# Patient Record
Sex: FEMALE | Race: WHITE | NOT HISPANIC OR LATINO | Employment: FULL TIME | URBAN - METROPOLITAN AREA
[De-identification: names, ages, dates, MRNs, and addresses within clinical notes are randomized per-mention and may not be internally consistent; named-entity substitution may affect disease eponyms.]

---

## 2017-06-15 ENCOUNTER — LAB REQUISITION (OUTPATIENT)
Dept: LAB | Facility: HOSPITAL | Age: 47
End: 2017-06-15
Payer: COMMERCIAL

## 2017-06-15 ENCOUNTER — ALLSCRIPTS OFFICE VISIT (OUTPATIENT)
Dept: OTHER | Facility: OTHER | Age: 47
End: 2017-06-15

## 2017-06-15 DIAGNOSIS — Z12.31 ENCOUNTER FOR SCREENING MAMMOGRAM FOR MALIGNANT NEOPLASM OF BREAST: ICD-10-CM

## 2017-06-15 DIAGNOSIS — Z01.419 ENCOUNTER FOR GYNECOLOGICAL EXAMINATION WITHOUT ABNORMAL FINDING: ICD-10-CM

## 2017-06-15 PROCEDURE — G0145 SCR C/V CYTO,THINLAYER,RESCR: HCPCS | Performed by: OBSTETRICS & GYNECOLOGY

## 2017-06-16 ENCOUNTER — TRANSCRIBE ORDERS (OUTPATIENT)
Dept: LAB | Facility: HOSPITAL | Age: 47
End: 2017-06-16

## 2017-06-27 LAB
LAB AP GYN PRIMARY INTERPRETATION: NORMAL
LAB AP LMP: NORMAL
Lab: NORMAL
PATH INTERP SPEC-IMP: NORMAL

## 2017-06-29 ENCOUNTER — ALLSCRIPTS OFFICE VISIT (OUTPATIENT)
Dept: OTHER | Facility: OTHER | Age: 47
End: 2017-06-29

## 2017-08-17 ENCOUNTER — ALLSCRIPTS OFFICE VISIT (OUTPATIENT)
Dept: OTHER | Facility: OTHER | Age: 47
End: 2017-08-17

## 2017-08-23 DIAGNOSIS — D39.10 NEOPLASM OF UNCERTAIN BEHAVIOR OF OVARY: ICD-10-CM

## 2017-08-24 ENCOUNTER — TRANSCRIBE ORDERS (OUTPATIENT)
Dept: ADMINISTRATIVE | Age: 47
End: 2017-08-24

## 2017-08-24 ENCOUNTER — APPOINTMENT (OUTPATIENT)
Dept: LAB | Age: 47
End: 2017-08-24
Payer: COMMERCIAL

## 2017-08-24 DIAGNOSIS — D39.10 NEOPLASM OF UNCERTAIN BEHAVIOR OF OVARY, UNSPECIFIED LATERALITY: ICD-10-CM

## 2017-08-24 DIAGNOSIS — D39.10 NEOPLASM OF UNCERTAIN BEHAVIOR OF OVARY, UNSPECIFIED LATERALITY: Primary | ICD-10-CM

## 2017-08-24 LAB — CANCER AG125 SERPL-ACNC: 14.7 U/ML (ref 0–30)

## 2017-08-24 PROCEDURE — 36415 COLL VENOUS BLD VENIPUNCTURE: CPT

## 2017-08-24 PROCEDURE — 86304 IMMUNOASSAY TUMOR CA 125: CPT

## 2017-09-14 ENCOUNTER — ALLSCRIPTS OFFICE VISIT (OUTPATIENT)
Dept: OTHER | Facility: OTHER | Age: 47
End: 2017-09-14

## 2017-09-27 ENCOUNTER — ALLSCRIPTS OFFICE VISIT (OUTPATIENT)
Dept: OTHER | Facility: OTHER | Age: 47
End: 2017-09-27

## 2017-09-29 ENCOUNTER — GENERIC CONVERSION - ENCOUNTER (OUTPATIENT)
Dept: OTHER | Facility: OTHER | Age: 47
End: 2017-09-29

## 2017-09-29 DIAGNOSIS — N83.202 CYST OF LEFT OVARY: ICD-10-CM

## 2017-09-29 DIAGNOSIS — R10.2 PELVIC AND PERINEAL PAIN: ICD-10-CM

## 2017-09-29 DIAGNOSIS — N93.9 ABNORMAL UTERINE AND VAGINAL BLEEDING, UNSPECIFIED: ICD-10-CM

## 2017-10-17 ENCOUNTER — TRANSCRIBE ORDERS (OUTPATIENT)
Dept: ADMINISTRATIVE | Age: 47
End: 2017-10-17

## 2017-10-17 ENCOUNTER — OFFICE VISIT (OUTPATIENT)
Dept: LAB | Age: 47
End: 2017-10-17
Payer: COMMERCIAL

## 2017-10-17 ENCOUNTER — APPOINTMENT (OUTPATIENT)
Dept: LAB | Age: 47
End: 2017-10-17
Payer: COMMERCIAL

## 2017-10-17 ENCOUNTER — APPOINTMENT (OUTPATIENT)
Dept: RADIOLOGY | Age: 47
End: 2017-10-17
Payer: COMMERCIAL

## 2017-10-17 DIAGNOSIS — N83.201 BILATERAL OVARIAN CYSTS: ICD-10-CM

## 2017-10-17 DIAGNOSIS — R10.2 ADNEXAL TENDERNESS, RIGHT: ICD-10-CM

## 2017-10-17 DIAGNOSIS — N93.9 ABNORMAL UTERINE AND VAGINAL BLEEDING, UNSPECIFIED: ICD-10-CM

## 2017-10-17 DIAGNOSIS — N83.202 CYST OF LEFT OVARY: ICD-10-CM

## 2017-10-17 DIAGNOSIS — N93.9 VAGINAL HEMORRHAGE: ICD-10-CM

## 2017-10-17 DIAGNOSIS — N83.202 BILATERAL OVARIAN CYSTS: ICD-10-CM

## 2017-10-17 DIAGNOSIS — R10.2 PELVIC AND PERINEAL PAIN: ICD-10-CM

## 2017-10-17 DIAGNOSIS — N93.9 VAGINAL HEMORRHAGE: Primary | ICD-10-CM

## 2017-10-17 LAB
ALBUMIN SERPL BCP-MCNC: 4.2 G/DL (ref 3.5–5)
ALP SERPL-CCNC: 78 U/L (ref 46–116)
ALT SERPL W P-5'-P-CCNC: 23 U/L (ref 12–78)
ANION GAP SERPL CALCULATED.3IONS-SCNC: 5 MMOL/L (ref 4–13)
APTT PPP: 32 SECONDS (ref 23–35)
AST SERPL W P-5'-P-CCNC: 12 U/L (ref 5–45)
ATRIAL RATE: 70 BPM
BASOPHILS # BLD AUTO: 0.03 THOUSANDS/ΜL (ref 0–0.1)
BASOPHILS NFR BLD AUTO: 0 % (ref 0–1)
BILIRUB SERPL-MCNC: 0.4 MG/DL (ref 0.2–1)
BUN SERPL-MCNC: 6 MG/DL (ref 5–25)
CALCIUM SERPL-MCNC: 9.2 MG/DL (ref 8.3–10.1)
CHLORIDE SERPL-SCNC: 99 MMOL/L (ref 100–108)
CO2 SERPL-SCNC: 31 MMOL/L (ref 21–32)
CREAT SERPL-MCNC: 0.53 MG/DL (ref 0.6–1.3)
EOSINOPHIL # BLD AUTO: 0.1 THOUSAND/ΜL (ref 0–0.61)
EOSINOPHIL NFR BLD AUTO: 1 % (ref 0–6)
ERYTHROCYTE [DISTWIDTH] IN BLOOD BY AUTOMATED COUNT: 12.9 % (ref 11.6–15.1)
EST. AVERAGE GLUCOSE BLD GHB EST-MCNC: 117 MG/DL
FSH SERPL-ACNC: 47.9 MIU/ML
GFR SERPL CREATININE-BSD FRML MDRD: 114 ML/MIN/1.73SQ M
GLUCOSE SERPL-MCNC: 95 MG/DL (ref 65–140)
HBA1C MFR BLD: 5.7 % (ref 4.2–6.3)
HCT VFR BLD AUTO: 41.6 % (ref 34.8–46.1)
HGB BLD-MCNC: 14.2 G/DL (ref 11.5–15.4)
INR PPP: 0.91 (ref 0.86–1.16)
LYMPHOCYTES # BLD AUTO: 3.92 THOUSANDS/ΜL (ref 0.6–4.47)
LYMPHOCYTES NFR BLD AUTO: 45 % (ref 14–44)
MCH RBC QN AUTO: 32 PG (ref 26.8–34.3)
MCHC RBC AUTO-ENTMCNC: 34.1 G/DL (ref 31.4–37.4)
MCV RBC AUTO: 94 FL (ref 82–98)
MONOCYTES # BLD AUTO: 0.63 THOUSAND/ΜL (ref 0.17–1.22)
MONOCYTES NFR BLD AUTO: 7 % (ref 4–12)
NEUTROPHILS # BLD AUTO: 3.99 THOUSANDS/ΜL (ref 1.85–7.62)
NEUTS SEG NFR BLD AUTO: 47 % (ref 43–75)
NRBC BLD AUTO-RTO: 0 /100 WBCS
P AXIS: 38 DEGREES
PLATELET # BLD AUTO: 283 THOUSANDS/UL (ref 149–390)
PMV BLD AUTO: 10.8 FL (ref 8.9–12.7)
POTASSIUM SERPL-SCNC: 4 MMOL/L (ref 3.5–5.3)
PR INTERVAL: 120 MS
PROT SERPL-MCNC: 7.4 G/DL (ref 6.4–8.2)
PROTHROMBIN TIME: 12.3 SECONDS (ref 12.1–14.4)
QRS AXIS: 52 DEGREES
QRSD INTERVAL: 82 MS
QT INTERVAL: 384 MS
QTC INTERVAL: 414 MS
RBC # BLD AUTO: 4.44 MILLION/UL (ref 3.81–5.12)
SODIUM SERPL-SCNC: 135 MMOL/L (ref 136–145)
T WAVE AXIS: 80 DEGREES
VENTRICULAR RATE: 70 BPM
WBC # BLD AUTO: 8.69 THOUSAND/UL (ref 4.31–10.16)

## 2017-10-17 PROCEDURE — 86900 BLOOD TYPING SEROLOGIC ABO: CPT | Performed by: OBSTETRICS & GYNECOLOGY

## 2017-10-17 PROCEDURE — 85730 THROMBOPLASTIN TIME PARTIAL: CPT

## 2017-10-17 PROCEDURE — 86901 BLOOD TYPING SEROLOGIC RH(D): CPT | Performed by: OBSTETRICS & GYNECOLOGY

## 2017-10-17 PROCEDURE — 85025 COMPLETE CBC W/AUTO DIFF WBC: CPT

## 2017-10-17 PROCEDURE — 36415 COLL VENOUS BLD VENIPUNCTURE: CPT

## 2017-10-17 PROCEDURE — 83036 HEMOGLOBIN GLYCOSYLATED A1C: CPT

## 2017-10-17 PROCEDURE — 86850 RBC ANTIBODY SCREEN: CPT | Performed by: OBSTETRICS & GYNECOLOGY

## 2017-10-17 PROCEDURE — 71020 HB CHEST X-RAY 2VW FRONTAL&LATL: CPT

## 2017-10-17 PROCEDURE — 83001 ASSAY OF GONADOTROPIN (FSH): CPT

## 2017-10-17 PROCEDURE — 80053 COMPREHEN METABOLIC PANEL: CPT

## 2017-10-17 PROCEDURE — 85610 PROTHROMBIN TIME: CPT

## 2017-10-17 PROCEDURE — 93005 ELECTROCARDIOGRAM TRACING: CPT

## 2017-10-18 ENCOUNTER — LAB REQUISITION (OUTPATIENT)
Dept: LAB | Facility: HOSPITAL | Age: 47
End: 2017-10-18
Payer: COMMERCIAL

## 2017-10-18 DIAGNOSIS — R10.2 PELVIC AND PERINEAL PAIN: ICD-10-CM

## 2017-10-18 DIAGNOSIS — N83.202 CYST OF LEFT OVARY: ICD-10-CM

## 2017-10-18 DIAGNOSIS — N93.9 ABNORMAL UTERINE AND VAGINAL BLEEDING, UNSPECIFIED (CODE): ICD-10-CM

## 2017-10-18 LAB
ABO GROUP BLD: NORMAL
BLD GP AB SCN SERPL QL: NEGATIVE
RH BLD: POSITIVE
SPECIMEN EXPIRATION DATE: NORMAL

## 2017-10-23 ENCOUNTER — ANESTHESIA EVENT (OUTPATIENT)
Dept: PERIOP | Facility: HOSPITAL | Age: 47
End: 2017-10-23
Payer: COMMERCIAL

## 2017-10-26 RX ORDER — CETIRIZINE HYDROCHLORIDE 10 MG/1
10 TABLET ORAL DAILY
COMMUNITY

## 2017-10-26 RX ORDER — MULTIVITAMIN
1 TABLET ORAL DAILY
COMMUNITY
End: 2018-06-06 | Stop reason: SDUPTHER

## 2017-10-26 NOTE — PRE-PROCEDURE INSTRUCTIONS
Pre-Surgery Instructions:   Medication Instructions    cetirizine (ZyrTEC) 10 mg tablet Instructed patient per Anesthesia Guidelines   Multiple Vitamin (MULTIVITAMIN) tablet Instructed patient per Anesthesia Guidelines  NPO Instructions    Please do not eat or drink anything prior to your surgery as follows: For AM Surgery times = stop at midnight the night before  For PM Surgery times = Midnight to 8AM clear liquids only (Jello, broth, 7up, Sprite, apple juice, black coffee, black tea, Gatorade)  If you are supposed to take any of your medications, do so with a sip of water  Failure to follow these instructions can lead to an increased risk of lung complications and may result in a delay or cancellation of your procedure  If you have any questions, contact your institution for further instructions  Medical Procedure Risk          Smoking Cessation    Smoking before and after surgery can lead to complications  Patients who quit smoking at least eight weeks before surgery have complication rates almost as low as non-smokers  Smokers who can stop smoking 24 or 48 hours before surgery may also benefit from decreased amounts of nicotine and carbon monoxide in the body  For help quitting smoking, speak with your physician or contact the Divina Mcduffie or [employer]  Please visit the following web address for assistance with quitting  [facility]  [facility]/Anesthesia-[email or web address]  aspx          Current every day smoker      Accept All Complete All

## 2017-10-31 ENCOUNTER — ANESTHESIA (OUTPATIENT)
Dept: PERIOP | Facility: HOSPITAL | Age: 47
End: 2017-10-31
Payer: COMMERCIAL

## 2017-10-31 ENCOUNTER — HOSPITAL ENCOUNTER (OUTPATIENT)
Facility: HOSPITAL | Age: 47
Setting detail: OUTPATIENT SURGERY
Discharge: HOME | End: 2017-11-01
Attending: OBSTETRICS & GYNECOLOGY | Admitting: OBSTETRICS & GYNECOLOGY
Payer: COMMERCIAL

## 2017-10-31 DIAGNOSIS — N93.9 ABNORMAL UTERINE BLEEDING: ICD-10-CM

## 2017-10-31 DIAGNOSIS — N83.202 CYST OF LEFT OVARY: ICD-10-CM

## 2017-10-31 DIAGNOSIS — R10.2 PELVIC AND PERINEAL PAIN: ICD-10-CM

## 2017-10-31 PROBLEM — N83.8 OVARIAN MASS, LEFT: Status: ACTIVE | Noted: 2017-10-31

## 2017-10-31 LAB
EXT PREGNANCY TEST URINE: NEGATIVE
GLUCOSE SERPL-MCNC: 95 MG/DL (ref 65–140)

## 2017-10-31 PROCEDURE — 86923 COMPATIBILITY TEST ELECTRIC: CPT

## 2017-10-31 PROCEDURE — 88307 TISSUE EXAM BY PATHOLOGIST: CPT | Performed by: OBSTETRICS & GYNECOLOGY

## 2017-10-31 PROCEDURE — 81025 URINE PREGNANCY TEST: CPT | Performed by: OBSTETRICS & GYNECOLOGY

## 2017-10-31 PROCEDURE — 82948 REAGENT STRIP/BLOOD GLUCOSE: CPT

## 2017-10-31 RX ORDER — LIDOCAINE HYDROCHLORIDE 10 MG/ML
INJECTION, SOLUTION INFILTRATION; PERINEURAL AS NEEDED
Status: DISCONTINUED | OUTPATIENT
Start: 2017-10-31 | End: 2017-10-31 | Stop reason: SURG

## 2017-10-31 RX ORDER — BUPIVACAINE HYDROCHLORIDE 2.5 MG/ML
INJECTION, SOLUTION INFILTRATION; PERINEURAL AS NEEDED
Status: DISCONTINUED | OUTPATIENT
Start: 2017-10-31 | End: 2017-10-31 | Stop reason: HOSPADM

## 2017-10-31 RX ORDER — HYDROMORPHONE HYDROCHLORIDE 2 MG/ML
INJECTION, SOLUTION INTRAMUSCULAR; INTRAVENOUS; SUBCUTANEOUS AS NEEDED
Status: DISCONTINUED | OUTPATIENT
Start: 2017-10-31 | End: 2017-10-31 | Stop reason: SURG

## 2017-10-31 RX ORDER — SODIUM CHLORIDE 9 MG/ML
INJECTION, SOLUTION INTRAVENOUS CONTINUOUS PRN
Status: DISCONTINUED | OUTPATIENT
Start: 2017-10-31 | End: 2017-10-31 | Stop reason: SURG

## 2017-10-31 RX ORDER — LORATADINE 10 MG/1
10 TABLET ORAL DAILY
Status: DISCONTINUED | OUTPATIENT
Start: 2017-11-01 | End: 2017-11-01 | Stop reason: HOSPADM

## 2017-10-31 RX ORDER — ONDANSETRON 2 MG/ML
INJECTION INTRAMUSCULAR; INTRAVENOUS AS NEEDED
Status: DISCONTINUED | OUTPATIENT
Start: 2017-10-31 | End: 2017-10-31 | Stop reason: SURG

## 2017-10-31 RX ORDER — ROCURONIUM BROMIDE 10 MG/ML
INJECTION, SOLUTION INTRAVENOUS AS NEEDED
Status: DISCONTINUED | OUTPATIENT
Start: 2017-10-31 | End: 2017-10-31 | Stop reason: SURG

## 2017-10-31 RX ORDER — FENTANYL CITRATE/PF 50 MCG/ML
50 SYRINGE (ML) INJECTION
Status: COMPLETED | OUTPATIENT
Start: 2017-10-31 | End: 2017-10-31

## 2017-10-31 RX ORDER — ONDANSETRON 2 MG/ML
4 INJECTION INTRAMUSCULAR; INTRAVENOUS ONCE AS NEEDED
Status: DISCONTINUED | OUTPATIENT
Start: 2017-10-31 | End: 2017-10-31 | Stop reason: HOSPADM

## 2017-10-31 RX ORDER — KETOROLAC TROMETHAMINE 30 MG/ML
INJECTION, SOLUTION INTRAMUSCULAR; INTRAVENOUS AS NEEDED
Status: DISCONTINUED | OUTPATIENT
Start: 2017-10-31 | End: 2017-10-31 | Stop reason: SURG

## 2017-10-31 RX ORDER — SODIUM CHLORIDE, SODIUM LACTATE, POTASSIUM CHLORIDE, CALCIUM CHLORIDE 600; 310; 30; 20 MG/100ML; MG/100ML; MG/100ML; MG/100ML
125 INJECTION, SOLUTION INTRAVENOUS CONTINUOUS
Status: DISCONTINUED | OUTPATIENT
Start: 2017-10-31 | End: 2017-11-01 | Stop reason: HOSPADM

## 2017-10-31 RX ORDER — PROPOFOL 10 MG/ML
INJECTION, EMULSION INTRAVENOUS AS NEEDED
Status: DISCONTINUED | OUTPATIENT
Start: 2017-10-31 | End: 2017-10-31 | Stop reason: SURG

## 2017-10-31 RX ORDER — MIDAZOLAM HYDROCHLORIDE 1 MG/ML
INJECTION INTRAMUSCULAR; INTRAVENOUS AS NEEDED
Status: DISCONTINUED | OUTPATIENT
Start: 2017-10-31 | End: 2017-10-31 | Stop reason: SURG

## 2017-10-31 RX ORDER — ALBUTEROL SULFATE 2.5 MG/3ML
2.5 SOLUTION RESPIRATORY (INHALATION) AS NEEDED
Status: DISCONTINUED | OUTPATIENT
Start: 2017-10-31 | End: 2017-10-31 | Stop reason: HOSPADM

## 2017-10-31 RX ORDER — NICOTINE 21 MG/24HR
1 PATCH, TRANSDERMAL 24 HOURS TRANSDERMAL DAILY
Status: DISCONTINUED | OUTPATIENT
Start: 2017-10-31 | End: 2017-11-01 | Stop reason: HOSPADM

## 2017-10-31 RX ORDER — ONDANSETRON 2 MG/ML
4 INJECTION INTRAMUSCULAR; INTRAVENOUS EVERY 6 HOURS PRN
Status: DISCONTINUED | OUTPATIENT
Start: 2017-10-31 | End: 2017-11-01 | Stop reason: HOSPADM

## 2017-10-31 RX ORDER — MEPERIDINE HYDROCHLORIDE 25 MG/ML
12.5 INJECTION INTRAMUSCULAR; INTRAVENOUS; SUBCUTANEOUS ONCE
Status: COMPLETED | OUTPATIENT
Start: 2017-10-31 | End: 2017-10-31

## 2017-10-31 RX ORDER — FENTANYL CITRATE 50 UG/ML
INJECTION, SOLUTION INTRAMUSCULAR; INTRAVENOUS AS NEEDED
Status: DISCONTINUED | OUTPATIENT
Start: 2017-10-31 | End: 2017-10-31 | Stop reason: SURG

## 2017-10-31 RX ORDER — OXYCODONE HYDROCHLORIDE AND ACETAMINOPHEN 5; 325 MG/1; MG/1
1 TABLET ORAL EVERY 4 HOURS PRN
Status: DISCONTINUED | OUTPATIENT
Start: 2017-10-31 | End: 2017-11-01 | Stop reason: HOSPADM

## 2017-10-31 RX ORDER — IBUPROFEN 400 MG/1
600 TABLET ORAL EVERY 6 HOURS PRN
Status: DISCONTINUED | OUTPATIENT
Start: 2017-10-31 | End: 2017-11-01 | Stop reason: HOSPADM

## 2017-10-31 RX ORDER — ACETAMINOPHEN 325 MG/1
650 TABLET ORAL EVERY 4 HOURS PRN
Status: DISCONTINUED | OUTPATIENT
Start: 2017-10-31 | End: 2017-11-01 | Stop reason: HOSPADM

## 2017-10-31 RX ORDER — DIPHENHYDRAMINE HYDROCHLORIDE 50 MG/ML
12.5 INJECTION INTRAMUSCULAR; INTRAVENOUS ONCE AS NEEDED
Status: DISCONTINUED | OUTPATIENT
Start: 2017-10-31 | End: 2017-10-31 | Stop reason: HOSPADM

## 2017-10-31 RX ORDER — OXYCODONE HYDROCHLORIDE 10 MG/1
10 TABLET ORAL EVERY 4 HOURS PRN
Status: DISCONTINUED | OUTPATIENT
Start: 2017-10-31 | End: 2017-11-01 | Stop reason: HOSPADM

## 2017-10-31 RX ORDER — GLYCOPYRROLATE 0.2 MG/ML
INJECTION INTRAMUSCULAR; INTRAVENOUS AS NEEDED
Status: DISCONTINUED | OUTPATIENT
Start: 2017-10-31 | End: 2017-10-31 | Stop reason: SURG

## 2017-10-31 RX ORDER — METOCLOPRAMIDE HYDROCHLORIDE 5 MG/ML
10 INJECTION INTRAMUSCULAR; INTRAVENOUS ONCE AS NEEDED
Status: DISCONTINUED | OUTPATIENT
Start: 2017-10-31 | End: 2017-10-31 | Stop reason: HOSPADM

## 2017-10-31 RX ORDER — CALCIUM CARBONATE 500(1250)
1 TABLET ORAL
Status: DISCONTINUED | OUTPATIENT
Start: 2017-11-01 | End: 2017-11-01 | Stop reason: HOSPADM

## 2017-10-31 RX ADMIN — CEFAZOLIN SODIUM 1000 MG: 1 SOLUTION INTRAVENOUS at 16:20

## 2017-10-31 RX ADMIN — OXYCODONE HYDROCHLORIDE 10 MG: 10 TABLET ORAL at 22:06

## 2017-10-31 RX ADMIN — FENTANYL CITRATE 50 MCG: 50 INJECTION, SOLUTION INTRAMUSCULAR; INTRAVENOUS at 16:18

## 2017-10-31 RX ADMIN — HYDROMORPHONE HYDROCHLORIDE 0.4 MG: 1 INJECTION, SOLUTION INTRAMUSCULAR; INTRAVENOUS; SUBCUTANEOUS at 18:38

## 2017-10-31 RX ADMIN — NICOTINE 1 PATCH: 21 PATCH, EXTENDED RELEASE TRANSDERMAL at 19:35

## 2017-10-31 RX ADMIN — HYDROMORPHONE HYDROCHLORIDE 0.4 MG: 1 INJECTION, SOLUTION INTRAMUSCULAR; INTRAVENOUS; SUBCUTANEOUS at 19:23

## 2017-10-31 RX ADMIN — MIDAZOLAM HYDROCHLORIDE 2 MG: 1 INJECTION, SOLUTION INTRAMUSCULAR; INTRAVENOUS at 16:04

## 2017-10-31 RX ADMIN — NEOSTIGMINE METHYLSULFATE 4 MG: 1 INJECTION, SOLUTION INTRAMUSCULAR; INTRAVENOUS; SUBCUTANEOUS at 17:57

## 2017-10-31 RX ADMIN — FENTANYL CITRATE 50 MCG: 50 INJECTION INTRAMUSCULAR; INTRAVENOUS at 18:30

## 2017-10-31 RX ADMIN — GLYCOPYRROLATE 0.8 MG: 0.2 INJECTION, SOLUTION INTRAMUSCULAR; INTRAVENOUS at 17:57

## 2017-10-31 RX ADMIN — MEPERIDINE HYDROCHLORIDE 12.5 MG: 25 INJECTION, SOLUTION INTRAMUSCULAR; INTRAVENOUS; SUBCUTANEOUS at 18:26

## 2017-10-31 RX ADMIN — ENOXAPARIN SODIUM 40 MG: 40 INJECTION SUBCUTANEOUS at 16:27

## 2017-10-31 RX ADMIN — DEXAMETHASONE SODIUM PHOSPHATE 10 MG: 10 INJECTION INTRAMUSCULAR; INTRAVENOUS at 16:28

## 2017-10-31 RX ADMIN — SODIUM CHLORIDE: 0.9 INJECTION, SOLUTION INTRAVENOUS at 16:15

## 2017-10-31 RX ADMIN — HYDROMORPHONE HYDROCHLORIDE 0.3 MG: 2 INJECTION, SOLUTION INTRAMUSCULAR; INTRAVENOUS; SUBCUTANEOUS at 17:13

## 2017-10-31 RX ADMIN — FENTANYL CITRATE 50 MCG: 50 INJECTION, SOLUTION INTRAMUSCULAR; INTRAVENOUS at 16:35

## 2017-10-31 RX ADMIN — PROPOFOL 200 MG: 10 INJECTION, EMULSION INTRAVENOUS at 16:13

## 2017-10-31 RX ADMIN — ROCURONIUM BROMIDE 10 MG: 10 INJECTION, SOLUTION INTRAVENOUS at 16:53

## 2017-10-31 RX ADMIN — HYDROMORPHONE HYDROCHLORIDE 0.4 MG: 1 INJECTION, SOLUTION INTRAMUSCULAR; INTRAVENOUS; SUBCUTANEOUS at 19:10

## 2017-10-31 RX ADMIN — SODIUM CHLORIDE, SODIUM LACTATE, POTASSIUM CHLORIDE, AND CALCIUM CHLORIDE 125 ML/HR: .6; .31; .03; .02 INJECTION, SOLUTION INTRAVENOUS at 18:50

## 2017-10-31 RX ADMIN — KETOROLAC TROMETHAMINE 15 MG: 30 INJECTION, SOLUTION INTRAMUSCULAR at 17:49

## 2017-10-31 RX ADMIN — HYDROMORPHONE HYDROCHLORIDE 0.7 MG: 2 INJECTION, SOLUTION INTRAMUSCULAR; INTRAVENOUS; SUBCUTANEOUS at 18:01

## 2017-10-31 RX ADMIN — ONDANSETRON 4 MG: 2 INJECTION INTRAMUSCULAR; INTRAVENOUS at 17:49

## 2017-10-31 RX ADMIN — FENTANYL CITRATE 50 MCG: 50 INJECTION INTRAMUSCULAR; INTRAVENOUS at 18:25

## 2017-10-31 RX ADMIN — SODIUM CHLORIDE, SODIUM LACTATE, POTASSIUM CHLORIDE, AND CALCIUM CHLORIDE: .6; .31; .03; .02 INJECTION, SOLUTION INTRAVENOUS at 16:04

## 2017-10-31 RX ADMIN — HYDROMORPHONE HYDROCHLORIDE 0.4 MG: 1 INJECTION, SOLUTION INTRAMUSCULAR; INTRAVENOUS; SUBCUTANEOUS at 18:57

## 2017-10-31 RX ADMIN — SODIUM CHLORIDE, SODIUM LACTATE, POTASSIUM CHLORIDE, AND CALCIUM CHLORIDE 125 ML/HR: .6; .31; .03; .02 INJECTION, SOLUTION INTRAVENOUS at 14:17

## 2017-10-31 RX ADMIN — ROCURONIUM BROMIDE 10 MG: 10 INJECTION, SOLUTION INTRAVENOUS at 17:13

## 2017-10-31 RX ADMIN — LIDOCAINE HYDROCHLORIDE 50 MG: 10 INJECTION, SOLUTION INFILTRATION; PERINEURAL at 16:13

## 2017-10-31 RX ADMIN — ROCURONIUM BROMIDE 40 MG: 10 INJECTION, SOLUTION INTRAVENOUS at 16:14

## 2017-10-31 NOTE — ANESTHESIA POSTPROCEDURE EVALUATION
Post-Op Assessment Note      CV Status:  Stable    Mental Status:  Alert and awake    Hydration Status:  Euvolemic    PONV Controlled:  Controlled    Airway Patency:  Patent    Post Op Vitals Reviewed: Yes          Staff: CRNA           BP   136/98     Temp (!) 97 3 °F (36 3 °C) (10/31/17 1808)    Pulse 91 (10/31/17 1808)   Resp 12 (10/31/17 1808)    SpO2 100 % (10/31/17 1808)

## 2017-10-31 NOTE — H&P
H&P Exam - Gynecology   [facility] 56 y o  female MRN: [id number]  Unit/Bed#: OR POOL Encounter: [id number]    Assessment/Plan     Assessment:  61-year-old para 2 who does not desire future fertility with an enlarging 4 7 x 4 8 cm left adnexal mass, left-sided pelvic pain, abnormal uterine bleeding    Plan:  Plan for Laparoscopic TLH and BS and LO, possible bilateral Oophorectomy  and all indicated procedures for the above mentioned      History of Present Illness     HPI: 61-year-old para 2 with pelvic pain and underwent an ultrasound in June 2017  This revealed the left ovary to measure 4 2 x 3 5 cm  She then had a follow-up ultrasound on August 17, 2017 that revealed the left ovary to measure 4 7 x 4 8 cm  The uterus was 8 7 x 5 6 cm  Endometrial thickness was 4 mm  She has persistent left sided pelvic pain  She does not require any pain medicine  She is able to perform her activities of daily living but the pain is annoying  She has abnormal uterine bleeding and that her menses have become infrequent and irregular with heavy bleeding  Review of Systems   Constitutional: Negative  Genitourinary:        Left sided chronic pelvic pain       Historical Information   History reviewed  No pertinent past medical history  Past Surgical History:   Procedure Laterality Date    MOUTH SURGERY      VAGINAL MASS EXCISION       OB/GYN History:   History reviewed  No pertinent family history  Social History   History   Alcohol Use    8 4 oz/week    14 Shots of liquor per week     History   Drug Use No     History   Smoking Status    Current Every Day Smoker    Packs/day: 0 25   Smokeless Tobacco    Never Used       Meds/Allergies   all current active meds have been reviewed  Allergies   Allergen Reactions    Sulfa Antibiotics GI Intolerance     Vomites alot       Objective   Vitals: Blood pressure 124/86, pulse 71, temperature 98 5 °F (36 9 °C), temperature source Tympanic Core, resp   rate 16, height 5' 2" (1 575 m), weight 53 1 kg (117 lb), SpO2 97 %  No intake or output data in the 24 hours ending 10/31/17 1517    Invasive Devices: Invasive Devices     Peripheral Intravenous Line            Peripheral IV 10/31/17 Left Hand less than 1 day                Physical Exam   Constitutional: She is oriented to person, place, and time  She appears well-developed and well-nourished  HENT:   Head: Normocephalic and atraumatic  Cardiovascular: Normal rate, regular rhythm and normal heart sounds  Pulmonary/Chest: Effort normal and breath sounds normal  No respiratory distress  She has no wheezes  She has no rales  Abdominal: Soft  Bowel sounds are normal    Genitourinary:   Genitourinary Comments: deferred   Musculoskeletal: Normal range of motion  Neurological: She is alert and oriented to person, place, and time  She has normal reflexes  Lab Results: I have personally reviewed pertinent reports  Imaging: I have personally reviewed pertinent reports  EKG, Pathology, and Other Studies: I have personally reviewed pertinent reports  Code Status: No Order  Advance Directive and Living Will:      Power of :    POLST:      Counseling / Coordination of Care  Total floor / unit time spent today 5 minutes  Greater than 50% of total time was spent with the patient and / or family counseling and / or coordination of care  A description of the counseling / coordination of care: Georgette Ormond

## 2017-10-31 NOTE — ANESTHESIA PREPROCEDURE EVALUATION
Review of Systems/Medical History  Patient summary reviewed  Chart reviewed  History of anesthetic complications     Cardiovascular  Negative cardio ROS Exercise tolerance: good,     Pulmonary  Smoker , ,        GI/Hepatic  Negative GI/hepatic ROS          Negative  ROS        Endo/Other  Negative endo/other ROS      GYN  Not currently pregnant ,     Comment: Ovarian cyst     Hematology  Negative hematology ROS      Musculoskeletal  Negative musculoskeletal ROS        Neurology  Negative neurology ROS      Psychology   Negative psychology ROS          Physical Exam    Airway    Mallampati score: II  TM Distance: >3 FB       Dental   No notable dental hx     Cardiovascular  Comment: Negative ROS,     Pulmonary      Other Findings      Lab Results   Component Value Date    WBC 8 69 10/17/2017    HGB 14 2 10/17/2017     10/17/2017     Lab Results   Component Value Date     (L) 10/17/2017    K 4 0 10/17/2017    BUN 6 10/17/2017    CREATININE 0 53 (L) 10/17/2017    GLUCOSE 95 10/17/2017     Lab Results   Component Value Date    PTT 32 10/17/2017      Lab Results   Component Value Date    INR 0 91 10/17/2017     Blood type O+/antibody neg  Lab Results   Component Value Date    HGBA1C 5 7 10/17/2017     Anesthesia Plan  ASA Score- 2       Anesthesia Type- general with ASA Monitors  Additional Monitors:   Airway Plan: ETT  Comment: Additional PIV  Induction- intravenous  Informed Consent- Anesthetic plan and risks discussed with patient, spouse, mother and sibling  I personally reviewed this patient with the CRNA  Discussed and agreed on the Anesthesia Plan with the CRNA  Yumi Olivo

## 2017-10-31 NOTE — OP NOTE
OPERATIVE REPORT  PATIENT NAME: Haresh Del Angel    :  1970  MRN: [id number]  Pt Location: [facility] OR ROOM 10    SURGERY DATE: 10/31/2017    Surgeon(s) and Role:     * Nakia Ritchie MD - Primary     * Leticia Mcgowan - Assisting     * Adalid Art MD - Assisting     * Lino Ashraf MD - Assisting    Preop Diagnosis:  Pelvic and perineal pain [R10 2]  Abnormal uterine bleeding [N93 9]  Cyst of left ovary [N83 202]    Post-Op Diagnosis Codes:     * Pelvic and perineal pain [R10 2]     * Abnormal uterine bleeding [N93 9]     * Cyst of left ovary [N83 202]    Procedure(s) (LRB):  TOTAL LAPAROSCOPIC HYSTERECTOMY; BILATERAL SALPINECTOMY; LEFT OOPHERECTOMY; POSSIBLE BILATERAL OOPHERECOTMY (N/A)  LAPAROTOMY EXPLORATORY (N/A)    Specimen(s):  ID Type Source Tests Collected by Time Destination   1 : UTERUS, CERVIX, B/L FALLOPIAN TUBES, LEFT OVARY Tissue Uterus TISSUE EXAM Nakia Ritchie MD 10/31/2017 1723        Estimated Blood Loss:   Minimal    Drains:       Anesthesia Type:   General    Operative Indications:  Pelvic and perineal pain [R10 2]  Abnormal uterine bleeding [N93 9]  Cyst of left ovary [V74156]  56year-old with a 4 7 x 4 8 cm left ovarian mass  She also has abnormal uterine bleeding  She presented for definitive operative management and potential surgical staging  Operative Findings:  1  Mobile uterus  Grossly normal cervix  2   On laparoscopy, there was no evidence of peritoneal or upper abdominal disease  The left ovary had a 5 cm simple appearing cyst   There were no surface excrescences  The right ovary was grossly normal  The cyst was opened after was removed and the lining was simple  3   Cystoscopy demonstrated bilateral jets and no evidence of bladder injury  Complications:   None    Procedure and Technique:  After informed consent was obtained, the patient was taken to the operating room where general endotracheal anesthesia was administered without incident    She was then prepped and draped in the normal sterile fashion in the low dorsal lithotomy position  Examination under anesthesia revealed the above-mentioned findings  A speculum was placed in the patient's vagina  The anterior lip of the cervix was grasped with a single-tooth tenaculum  A 0 Vicryl stay suture was placed at 3 o'clock on the cervix  The uterus sounded to 7 cm  The cervix was dilated  A uterine manipulator and Koh cup were placed  Darden catheter was inserted  Attention was then turned to the abdomen  0 25% Marcaine was used to infiltrate the skin prior to placement of any trocar  The 1st insertion site was at the supraumbilical fold  A 5 mm skin incision was made using an 11 blade scalpel  Through this was passed a 5 mm trocar under direct visualization into the abdominal cavity  The abdomen is then insufflated to 15 mm of mercury using CO2 gas  Findings are noted as above  Additional ports were placed under direct visualization  A 5 mm port was placed in left lower quadrant and a 5 mm port was placed in the right lower quadrant  The retroperitoneal spaces were opened by transecting the round ligaments bilaterally  The ureters were identified coursing normally within the medial leaf of the broad ligaments bilaterally  On the patient's right side, the fallopian tube was grasped and elevated  The mesial salpinx was cauterized and transected  The vesicovaginal space was then developed  The bladder was taken down below the level of the external os of the cervix  The utero-ovarian ligament on the right side was cauterized and transected  The left infundibulopelvic ligament was cauterized and transected  The uterine vessels were skeletonized bilaterally  They were cauterized and transected  The cardinal ligaments were cauterized and transected  A circumferential colpotomy was then performed using monopolar cautery    The uterus, cervix, bilateral fallopian tubes and left ovary were then removed transvaginally  The vagina was then closed using a running 2-0 stratafix suture with excellent hemostasis noted  The pelvis was irrigated  The pressure in the pelvis was brought down to 5 mm of mercury  There is no evidence of bleeding noted  The gas was then removed from the abdominal cavity  The ports were then removed  The skin was closed at all sites using 4-0 Monocryl followed by histoacryl  The Darden catheter was removed  The bladder was insufflated with 200 cc of normal saline  The 5 mm laparoscoped was used as a cystoscope  Findings are noted as above  The Darden catheter was then replaced  The vagina was inspected  There is no evidence of bleeding noted  The patient was then awakened and transferred to recovery room stable condition  All instrument counts correct x2 for the procedure  No complications  Estimated blood loss 50 mL     I was present for the entire procedure    Patient Disposition:  PACU     SIGNATURE: Jaci Carrillo MD  DATE: October 31, 2017  TIME: 6:09 PM

## 2017-11-01 VITALS
RESPIRATION RATE: 18 BRPM | SYSTOLIC BLOOD PRESSURE: 135 MMHG | TEMPERATURE: 98.5 F | HEART RATE: 88 BPM | BODY MASS INDEX: 21.53 KG/M2 | WEIGHT: 117 LBS | HEIGHT: 62 IN | OXYGEN SATURATION: 99 % | DIASTOLIC BLOOD PRESSURE: 86 MMHG

## 2017-11-01 LAB
ABO GROUP BLD BPU: NORMAL
ABO GROUP BLD BPU: NORMAL
ANION GAP SERPL CALCULATED.3IONS-SCNC: 6 MMOL/L (ref 4–13)
BASOPHILS # BLD AUTO: 0 THOUSANDS/ΜL (ref 0–0.1)
BASOPHILS NFR BLD AUTO: 0 % (ref 0–1)
BPU ID: NORMAL
BPU ID: NORMAL
BUN SERPL-MCNC: 5 MG/DL (ref 5–25)
CALCIUM SERPL-MCNC: 8.2 MG/DL (ref 8.3–10.1)
CHLORIDE SERPL-SCNC: 104 MMOL/L (ref 100–108)
CO2 SERPL-SCNC: 28 MMOL/L (ref 21–32)
CREAT SERPL-MCNC: 0.51 MG/DL (ref 0.6–1.3)
EOSINOPHIL # BLD AUTO: 0 THOUSAND/ΜL (ref 0–0.61)
EOSINOPHIL NFR BLD AUTO: 0 % (ref 0–6)
ERYTHROCYTE [DISTWIDTH] IN BLOOD BY AUTOMATED COUNT: 13 % (ref 11.6–15.1)
GFR SERPL CREATININE-BSD FRML MDRD: 116 ML/MIN/1.73SQ M
GLUCOSE P FAST SERPL-MCNC: 138 MG/DL (ref 65–99)
GLUCOSE SERPL-MCNC: 138 MG/DL (ref 65–140)
HCT VFR BLD AUTO: 34.2 % (ref 34.8–46.1)
HGB BLD-MCNC: 11.8 G/DL (ref 11.5–15.4)
LYMPHOCYTES # BLD AUTO: 1.15 THOUSANDS/ΜL (ref 0.6–4.47)
LYMPHOCYTES NFR BLD AUTO: 10 % (ref 14–44)
MCH RBC QN AUTO: 32.2 PG (ref 26.8–34.3)
MCHC RBC AUTO-ENTMCNC: 34.5 G/DL (ref 31.4–37.4)
MCV RBC AUTO: 93 FL (ref 82–98)
MONOCYTES # BLD AUTO: 0.49 THOUSAND/ΜL (ref 0.17–1.22)
MONOCYTES NFR BLD AUTO: 4 % (ref 4–12)
NEUTROPHILS # BLD AUTO: 9.49 THOUSANDS/ΜL (ref 1.85–7.62)
NEUTS SEG NFR BLD AUTO: 86 % (ref 43–75)
NRBC BLD AUTO-RTO: 0 /100 WBCS
PLATELET # BLD AUTO: 226 THOUSANDS/UL (ref 149–390)
PMV BLD AUTO: 10.1 FL (ref 8.9–12.7)
POTASSIUM SERPL-SCNC: 4 MMOL/L (ref 3.5–5.3)
RBC # BLD AUTO: 3.67 MILLION/UL (ref 3.81–5.12)
SODIUM SERPL-SCNC: 138 MMOL/L (ref 136–145)
UNIT DISPENSE STATUS: NORMAL
UNIT DISPENSE STATUS: NORMAL
UNIT PRODUCT CODE: NORMAL
UNIT PRODUCT CODE: NORMAL
UNIT RH: NORMAL
UNIT RH: NORMAL
WBC # BLD AUTO: 11.14 THOUSAND/UL (ref 4.31–10.16)

## 2017-11-01 PROCEDURE — 85025 COMPLETE CBC W/AUTO DIFF WBC: CPT | Performed by: OBSTETRICS & GYNECOLOGY

## 2017-11-01 PROCEDURE — 80048 BASIC METABOLIC PNL TOTAL CA: CPT | Performed by: OBSTETRICS & GYNECOLOGY

## 2017-11-01 RX ORDER — OXYCODONE HYDROCHLORIDE AND ACETAMINOPHEN 5; 325 MG/1; MG/1
1 TABLET ORAL EVERY 4 HOURS PRN
Refills: 0
Start: 2017-11-01 | End: 2017-11-11

## 2017-11-01 RX ADMIN — Medication 1 TABLET: at 08:12

## 2017-11-01 RX ADMIN — IBUPROFEN 600 MG: 400 TABLET, FILM COATED ORAL at 08:12

## 2017-11-01 RX ADMIN — LORATADINE 10 MG: 10 TABLET ORAL at 08:12

## 2017-11-01 RX ADMIN — SODIUM CHLORIDE, SODIUM LACTATE, POTASSIUM CHLORIDE, AND CALCIUM CHLORIDE 125 ML/HR: .6; .31; .03; .02 INJECTION, SOLUTION INTRAVENOUS at 00:30

## 2017-11-01 RX ADMIN — NICOTINE 1 PATCH: 21 PATCH, EXTENDED RELEASE TRANSDERMAL at 08:13

## 2017-11-01 RX ADMIN — SODIUM CHLORIDE 1000 ML: 0.9 INJECTION, SOLUTION INTRAVENOUS at 00:29

## 2017-11-01 NOTE — PROGRESS NOTES
Gyn Oncology Post op note  Noe Cantor 56 y o  female MRN: [id number]  Unit/Bed#: CW2 216-02 Encounter: [id number]    Noe Cantor has no current complaints  Pain is none  Patient is currently voiding urine in vargas catheter  She is not ambulating  Patient is not currently passing flatus and has had no bowel movement  She is tolerating clear liquid PO, and denies nausea or vomitting  Patient denies fever, chills, chest pain, shortness of breath, or calf tenderness  /76   Pulse 103   Temp 98 4 °F (36 9 °C) (Oral)   Resp 16   Ht 5' 2" (1 575 m)   Wt 53 1 kg (117 lb)   SpO2 93%   BMI 21 40 kg/m²     I/O last 3 completed shifts: In: 2100 [I V :2100]  Out: -   I/O this shift:  In: 125 [I V :125]  Out: 375 [Urine:375]   ~75mL of clear yellow urine in vargas now    Lab Results   Component Value Date    WBC 8 69 10/17/2017    HGB 14 2 10/17/2017    HCT 41 6 10/17/2017    MCV 94 10/17/2017     10/17/2017       Lab Results   Component Value Date    GLUCOSE 95 10/17/2017    CALCIUM 9 2 10/17/2017     (L) 10/17/2017    K 4 0 10/17/2017    CO2 31 10/17/2017    CL 99 (L) 10/17/2017    BUN 6 10/17/2017    CREATININE 0 53 (L) 10/17/2017       Lab Results   Component Value Date/Time    POCGLU 95 10/31/2017 06:12 PM       Physical Exam  Gen: AAOx3, NAD, comfortable in bed  Abdomen: soft, non tender, incisions C/D/I  Extremities: SCDs on and on, non tender    A/P: 56 y o  s/p TLH, bilateral salpingectomy, cysto for pelvic pain with left ovarian mass & AUB  1) pelvic pain, L ovarian mass, AUB- s/p resection, f/u final path as out-pt  2) Post-op care:  *Pain: tylenol, motrin, roxicodone PRN  *FEN: regular diet, IV [employer]  *DVT PPx: SCDs  *Encouraged incentive spirometry to reduce atelectasis and pneumonia risk  *Encouraged ambulation as tolerated  *maintain vargas catheter overnight for I/O  *low urine output ~18mL/hr  Pt states she feels dehydrated  Will give 1L NS bolus   VSS and no concern for active bleeding    3) Dispo: anticipate d/c home tomorrow if spontaneously voiding, pain controlled, ambulating without assistance     Kym Daley  10/31/2017  11:25 PM

## 2017-11-01 NOTE — DISCHARGE INSTRUCTIONS
Laparoscopic Hysterectomy   WHAT YOU SHOULD KNOW:   Laparoscopic hysterectomy [facility]) is surgery to remove your uterus only (partial hysterectomy), or your uterus and cervix (total hysterectomy)  Other organs, such as your ovaries and fallopian tubes, may also be removed  AFTER YOU LEAVE:   Medicines:   · Medicines  may be given to decrease pain or to treat or prevent a bacterial infection  Ask your primary healthcare provider [facility]) how to take prescription pain medicine safely  You may also need to take hormone medicine, such as estrogen  · Take your medicine as directed  Contact your PHP if you think your medicine is not helping or if you have side effects  Tell him if you are allergic to any medicine  Keep a list of the medicines, vitamins, and herbs you take  Include the amounts, and when and why you take them  Bring the list or the pill bottles to follow-up visits  Carry your medicine list with you in case of an emergency  Activity guidelines: You may feel like resting more after surgery  Slowly start to do more each day  Rest when you feel it is needed  Ask when you can return to your usual activities  What to expect after surgery:   · Ask your gynecologist or PHP about routine tests that you will need  · It is normal to have some bleeding from your vagina after certain types of hysterectomy surgery  Ask your gynecologist or PHP if you should expect bleeding, and how much bleeding to expect  Contact your gynecologist or PHP if:   · You have a fever  · You have pain that gets worse or does not decrease or go away with treatment  · You notice pus or a foul-smelling drainage coming from an incision, or it is red or swollen  · You have new or more bright red bleeding from your vagina or your incisions  · You have yellow, green, or foul-smelling discharge coming from your vagina  · You have trouble urinating, burning when you urinate, or feel a need to urinate often      · You have trouble having a bowel movement  · Your skin is itchy, swollen, or has a rash  · You have questions or concerns about your condition or care  Seek care immediately or call 911 if:   · You are bleeding from your vagina, or bleeding more than you were told to expect  · Your arm or leg feels warm, tender, and painful  It may look swollen and red  · You feel lightheaded, short of breath, and have chest pain  · You cough up blood  © 2014 [address] is for End User's use only and may not be sold, redistributed or otherwise used for commercial purposes  All illustrations and images included in CareNotes® are the copyrighted property of A D A M [employer]  or Hank Gimenez  The above information is an  only  It is not intended as medical advice for individual conditions or treatments  Talk to your doctor, nurse or pharmacist before following any medical regimen to see if it is safe and effective for you  You will have pain post-operatively while you recover  Please take the pain meds as needed  Nothing in the vagina for 4-6 weeks until cleared by your physician  This includes no intercourse or sexual activity  Do not carry anything heavier than a gallon of a milk for 1-2 weeks  No driving while requiring pain meds  You may have light spotting, but any heavy bleeding requiring 1 pad/hour is not normal   You have multiple small incisions on your abdomen  Daily soap and water will suffice for cleaning  Please monitor signs of infection like redness, pain, white discharge, bleeding from incision sites  Please call your Gyn for any worsening pain, fevers, nausea/vomiting, or signs of infection  Thank you

## 2017-11-01 NOTE — PROGRESS NOTES
POD#1 MIGS fellow note  S p TLH, LSO, RS, cystoscopy    S; Doing well, pain controlled, ambulating, voiding spontaneously   Tolerating diet no n/v/cp/sob    O: VSS/AF    Lungs Cleaer  CV RRR  Abd: Soft ND/NT  Ext: no c/c/c/e    A: pod#1 doing well    P: Anticipate discharge home today  Routine proecautions/ wound care instructions

## 2017-11-01 NOTE — PLAN OF CARE
Problem: PAIN - ADULT  Goal: Verbalizes/displays adequate comfort level or baseline comfort level  Interventions:  - Encourage patient to monitor pain and request assistance  - Assess pain using appropriate pain scale  - Administer analgesics based on type and severity of pain and evaluate response  - Implement non-pharmacological measures as appropriate and evaluate response  - Consider cultural and social influences on pain and pain management  - Notify physician/advanced practitioner if interventions unsuccessful or patient reports new pain   Outcome: Progressing      Problem: INFECTION - ADULT  Goal: Absence or prevention of progression during hospitalization  INTERVENTIONS:  - Assess and monitor for signs and symptoms of infection  - Monitor lab/diagnostic results  - Monitor all insertion sites, i e  indwelling lines, tubes, and drains  - Monitor endotracheal (as able) and nasal secretions for changes in amount and color  - Eloy appropriate cooling/warming therapies per order  - Administer medications as ordered  - Instruct and encourage patient and family to use good hand hygiene technique  - Identify and instruct in appropriate isolation precautions for identified infection/condition   Outcome: Progressing      Problem: SAFETY ADULT  Goal: Patient will remain free of falls  INTERVENTIONS:  - Assess patient frequently for physical needs  -  Identify cognitive and physical deficits and behaviors that affect risk of falls    -  Eloy fall precautions as indicated by assessment   - Educate patient/family on patient safety including physical limitations  - Instruct patient to call for assistance with activity based on assessment  - Modify environment to reduce risk of injury  - Consider OT/PT consult to assist with strengthening/mobility   Outcome: Progressing    Goal: Maintain or return to baseline ADL function  INTERVENTIONS:  -  Assess patient's ability to carry out ADLs; assess patient's baseline for ADL function and identify physical deficits which impact ability to perform ADLs (bathing, care of mouth/teeth, toileting, grooming, dressing, etc )  - Assess/evaluate cause of self-care deficits   - Assess range of motion  - Assess patient's mobility; develop plan if impaired  - Assess patient's need for assistive devices and provide as appropriate  - Encourage maximum independence but intervene and supervise when necessary  ¯ Involve family in performance of ADLs  ¯ Assess for home care needs following discharge   ¯ Request OT consult to assist with ADL evaluation and planning for discharge  ¯ Provide patient education as appropriate   Outcome: Progressing    Goal: Maintain or return mobility status to optimal level  INTERVENTIONS:  - Assess patient's baseline mobility status (ambulation, transfers, stairs, etc )    - Identify cognitive and physical deficits and behaviors that affect mobility  - Identify mobility aids required to assist with transfers and/or ambulation (gait belt, sit-to-stand, lift, walker, cane, etc )  - [place] fall precautions as indicated by assessment  - Record patient progress and toleration of activity level on Mobility SBAR; progress patient to next Phase/Stage  - Instruct patient to call for assistance with activity based on assessment  - Request Rehabilitation consult to assist with strengthening/weightbearing, etc    Outcome: Progressing      Problem: DISCHARGE PLANNING  Goal: Discharge to home or other facility with appropriate resources  INTERVENTIONS:  - Identify barriers to discharge w/patient and caregiver  - Arrange for needed discharge resources and transportation as appropriate  - Identify discharge learning needs (meds, wound care, etc )  - Arrange for interpretive services to assist at discharge as needed  - Refer to Case Management Department for coordinating discharge planning if the patient needs post-hospital services based on physician/advanced practitioner order or complex needs related to functional status, cognitive ability, or social support system   Outcome: Progressing      Problem: SKIN/TISSUE INTEGRITY - ADULT  Goal: Incision(s), wounds(s) or drain site(s) healing without S/S of infection  INTERVENTIONS  - Assess and document risk factors for skin impairment   - Assess and document dressing, incision, wound bed, drain sites and surrounding tissue  - Initiate Nutrition services consult and/or wound management as needed   Outcome: Progressing

## 2017-11-01 NOTE — CASE MANAGEMENT
10/31/17 2157  Outpatient No Charge Bed Once     Transfer Service: GYN Oncology       Question: Admitting Physician Answer: Biju Villarreal        TOTAL LAPAROSCOPIC HYSTERECTOMY; BILATERAL SALPINECTOMY; LEFT OOPHERECTOMY; CYSTOSCOPY    /86   Pulse 88   Temp 98 5 °F (36 9 °C) (Oral)   Resp 18   Ht 5' 2" (1 575 m)   Wt 53 1 kg (117 lb)   SpO2 99%   BMI 21 40 kg/m²     Scheduled Meds:  calcium carbonate 1 tablet Oral Daily With Breakfast   loratadine 10 mg Oral Daily   nicotine 1 patch Transdermal Daily     Continuous Infusions:  lactated ringers 125 mL/hr Last Rate: Stopped (11/01/17 0029)   lactated ringers 125 mL/hr Last Rate: Stopped (11/01/17 1111)     PRN Meds:   acetaminophen    ibuprofen    ondansetron    oxyCODONE    oxyCODONE-acetaminophen

## 2017-11-01 NOTE — PROGRESS NOTES
Gyn Oncology Progress note   Antonia Knox 56 y o  female MRN: [id number]  Unit/Bed#: CW2 216-02 Encounter: [id number]    Antonia Knox has no current complaints  Pain is present - adequately treated  Patient is currently voiding with a vargas  She is not ambulating  Patient is not currently passing flatus and has had no bowel movement  She is tolerating PO, and denies nausea or vomitting  Patient denies fever, chills, chest pain, shortness of breath, or calf tenderness  /66   Pulse 102   Temp 98 2 °F (36 8 °C) (Oral)   Resp 18   Ht 5' 2" (1 575 m)   Wt 53 1 kg (117 lb)   SpO2 97%   BMI 21 40 kg/m²     I/O last 3 completed shifts: In: 2100 [I V :2100]  Out: -   I/O this shift:  In: 125 [I V :125]  Out: 1950 [Urine:1950]    Lab Results   Component Value Date    WBC 11 14 (H) 11/01/2017    HGB 11 8 11/01/2017    HCT 34 2 (L) 11/01/2017    MCV 93 11/01/2017     11/01/2017       Lab Results   Component Value Date    GLUCOSE 95 10/17/2017    CALCIUM 9 2 10/17/2017     (L) 10/17/2017    K 4 0 10/17/2017    CO2 31 10/17/2017    CL 99 (L) 10/17/2017    BUN 6 10/17/2017    CREATININE 0 53 (L) 10/17/2017       No results found for: MG    Lab Results   Component Value Date/Time    POCGLU 95 10/31/2017 06:12 PM       Physical Exam  Gen: AAOx3, NAD, comfortable in bed   CVS:  RRR, no murmurs or gallops  Lungs: CTA B/L, no rales or rhonchi,  normal respiratory effort and rate  Abdomen: soft, non tender, incisions C/D/I  Extremities: SCDs on and on, non tender, no edema, negative Homans       A/P: 56 y o  s/p TLH, bilateral salpingectomy, cysto for pelvic pain with left ovarian mass & AUB, POD #1   1) pelvic pain, L ovarian mass, AUB- s/p resection, f/u final path as out-pt  2) Post-op care: Hb 11 8 from 14 2, VSS, no signs of active bleeding, hemodilution component secondary to IV hydration   3)Pain: tylenol, motrin, roxicodone PRN  4)FEN: regular diet, IV [email or web address]  5)PPx: SCDs, Encouraged incentive spirometry to reduce atelectasis and pneumonia risk,Encouraged ambulation as tolerated  6)Oliguria: resolved after 1lt bolus  VSS and no concern for active bleeding   Darden to be removed today  7) smoker: nicotine patch  3) Dispo: anticipate d/c home today

## 2017-11-01 NOTE — RESTORATIVE TECHNICIAN NOTE
Restorative Specialist Mobility Note       Activity: Ambulate in [place]     Assistive Device: None

## 2017-11-15 ENCOUNTER — ALLSCRIPTS OFFICE VISIT (OUTPATIENT)
Dept: OTHER | Facility: OTHER | Age: 47
End: 2017-11-15

## 2017-11-19 ENCOUNTER — HOSPITAL ENCOUNTER (EMERGENCY)
Facility: HOSPITAL | Age: 47
Discharge: HOME | End: 2017-11-19
Attending: EMERGENCY MEDICINE | Admitting: EMERGENCY MEDICINE
Payer: COMMERCIAL

## 2017-11-19 VITALS
RESPIRATION RATE: 18 BRPM | HEART RATE: 110 BPM | OXYGEN SATURATION: 100 % | WEIGHT: 114 LBS | BODY MASS INDEX: 20.85 KG/M2 | DIASTOLIC BLOOD PRESSURE: 85 MMHG | TEMPERATURE: 97.8 F | SYSTOLIC BLOOD PRESSURE: 156 MMHG

## 2017-11-19 DIAGNOSIS — N99.820 POSTOPERATIVE VAGINAL BLEEDING FOLLOWING GENITOURINARY PROCEDURE: Primary | ICD-10-CM

## 2017-11-19 PROCEDURE — 99283 EMERGENCY DEPT VISIT LOW MDM: CPT

## 2017-11-19 NOTE — DISCHARGE INSTRUCTIONS
Postoperative Bleeding   WHAT YOU NEED TO KNOW:   Postoperative bleeding is bleeding after surgery  The incision may bleed, but bleeding can also occur inside the body  The bleeding may start immediately, or several days after surgery  Postoperative bleeding can become life-threatening  DISCHARGE INSTRUCTIONS:   Follow up with your healthcare provider as directed:  Write down your questions so you remember to ask them during your visits  Contact your healthcare provider if:   · You feel anxious or confused  · You have questions or concerns about your condition or care  Return to the emergency department if:   · Blood soaks through the bandage covering your incision  · Your heart is beating faster than normal for you  · You are breathing faster than normal for you, or you feel short of breath  · You are urinating less than usual, or not at all  · Your skin feels cool and clammy  © 2017 [facility] Information is for End User's use only and may not be sold, redistributed or otherwise used for commercial purposes  All illustrations and images included in CareNotes® are the copyrighted property of A D A M , Inc  or Hank Gimenez  The above information is an  only  It is not intended as medical advice for individual conditions or treatments  Talk to your doctor, nurse or pharmacist before following any medical regimen to see if it is safe and effective for you

## 2017-11-19 NOTE — ED ATTENDING ATTESTATION
Zack Juarez MD, saw and evaluated the patient  I have discussed the patient with the resident/non-physician practitioner and agree with the resident's/non-physician practitioner's findings, Plan of Care, and MDM as documented in the resident's/non-physician practitioner's note, except where noted  All available labs and Radiology studies were reviewed  At this point I agree with the current assessment done in the Emergency Department  I have conducted an independent evaluation of this patient a history and physical is as follows:  Pt 3 weeks ago had hysterectomy do to mass Thursday started with brownish to red bloody discharge Saturday had blood which was small amount today brownish  Pt had digital penetration Wednesday evening then these symptoms started   PE: alert nad heart reg lungs clear abd soft nontender incision healing well MDM: will discuss with gyn    Critical Care Time  CritCare Time
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
## 2017-11-19 NOTE — ED NOTES
OB/GYN here, pelvic exam done, may go home with follow up Dr Valerie Ding, pt has appointment for 12/13/2017     Yudy Aponte RN  11/19/17 5588

## 2017-12-13 ENCOUNTER — GENERIC CONVERSION - ENCOUNTER (OUTPATIENT)
Dept: OTHER | Facility: OTHER | Age: 47
End: 2017-12-13

## 2018-01-03 ENCOUNTER — GENERIC CONVERSION - ENCOUNTER (OUTPATIENT)
Dept: OTHER | Facility: OTHER | Age: 48
End: 2018-01-03

## 2018-01-13 VITALS
HEART RATE: 88 BPM | SYSTOLIC BLOOD PRESSURE: 138 MMHG | BODY MASS INDEX: 22.35 KG/M2 | HEIGHT: 61 IN | WEIGHT: 118.38 LBS | RESPIRATION RATE: 16 BRPM | DIASTOLIC BLOOD PRESSURE: 78 MMHG | TEMPERATURE: 98.3 F

## 2018-01-14 VITALS
BODY MASS INDEX: 22.09 KG/M2 | DIASTOLIC BLOOD PRESSURE: 70 MMHG | WEIGHT: 117 LBS | HEIGHT: 61 IN | SYSTOLIC BLOOD PRESSURE: 110 MMHG

## 2018-01-14 VITALS
RESPIRATION RATE: 16 BRPM | WEIGHT: 116.13 LBS | TEMPERATURE: 98.3 F | HEIGHT: 61 IN | HEART RATE: 80 BPM | DIASTOLIC BLOOD PRESSURE: 70 MMHG | SYSTOLIC BLOOD PRESSURE: 128 MMHG | BODY MASS INDEX: 21.93 KG/M2

## 2018-01-14 VITALS
SYSTOLIC BLOOD PRESSURE: 122 MMHG | WEIGHT: 118.38 LBS | BODY MASS INDEX: 22.35 KG/M2 | DIASTOLIC BLOOD PRESSURE: 80 MMHG | HEIGHT: 61 IN

## 2018-01-24 VITALS
HEART RATE: 110 BPM | BODY MASS INDEX: 22.28 KG/M2 | WEIGHT: 118 LBS | RESPIRATION RATE: 14 BRPM | SYSTOLIC BLOOD PRESSURE: 108 MMHG | TEMPERATURE: 98.5 F | DIASTOLIC BLOOD PRESSURE: 70 MMHG | HEIGHT: 61 IN

## 2018-01-24 VITALS
OXYGEN SATURATION: 99 % | HEIGHT: 61 IN | DIASTOLIC BLOOD PRESSURE: 66 MMHG | SYSTOLIC BLOOD PRESSURE: 118 MMHG | WEIGHT: 118.38 LBS | RESPIRATION RATE: 14 BRPM | BODY MASS INDEX: 22.35 KG/M2 | TEMPERATURE: 97.8 F | HEART RATE: 100 BPM

## 2018-02-13 ENCOUNTER — OFFICE VISIT (OUTPATIENT)
Dept: OBGYN CLINIC | Facility: CLINIC | Age: 48
End: 2018-02-13
Payer: COMMERCIAL

## 2018-02-13 VITALS
SYSTOLIC BLOOD PRESSURE: 100 MMHG | WEIGHT: 114.8 LBS | BODY MASS INDEX: 21.12 KG/M2 | DIASTOLIC BLOOD PRESSURE: 62 MMHG | HEIGHT: 62 IN

## 2018-02-13 DIAGNOSIS — E89.41 HOT FLASHES DUE TO SURGICAL MENOPAUSE: Primary | ICD-10-CM

## 2018-02-13 DIAGNOSIS — Z01.419 ENCOUNTER FOR GYNECOLOGICAL EXAMINATION: ICD-10-CM

## 2018-02-13 PROCEDURE — 99214 OFFICE O/P EST MOD 30 MIN: CPT | Performed by: OBSTETRICS & GYNECOLOGY

## 2018-02-13 RX ORDER — TRAZODONE HYDROCHLORIDE 50 MG/1
1 TABLET ORAL
COMMUNITY
Start: 2017-11-15 | End: 2018-02-13 | Stop reason: SDUPTHER

## 2018-02-13 RX ORDER — MULTIVITAMIN
TABLET ORAL
COMMUNITY

## 2018-02-13 RX ORDER — NICOTINE POLACRILEX 2 MG
GUM BUCCAL
COMMUNITY

## 2018-02-13 NOTE — PATIENT INSTRUCTIONS
Follow-up visit for assessment of a is a motor symptoms in 3 months    Call if any problems develop during the interim

## 2018-02-13 NOTE — PROGRESS NOTES
Assessment/Plan:     Normal gynecologic examination    We discussed options of therapy for her significant vasomotor symptoms    At this point in time she feels most comfortable resuming the black cohosh    Prescription for trazodone will be given today to aid with sleep    We discussed the use of estrogen replacement therapy should the natural alternative not work well or if the symptoms worsen    Plan    We will continue black cohosh as a means to alleviate the vasomotor symptoms    She will use the trazodone as needed for sleep and a prescription was given to her for that today    We will reassess the clinical situation in 3 months and see if any adjustments have to be made at that time    She was told to call the office should any problems develop during the interim       Diagnoses and all orders for this visit:    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    Encounter for gynecological examination    Other orders  -     traZODone (DESYREL) 50 mg tablet; Take 1 tablet by mouth  -     Multiple Vitamin (MULTI-VITAMIN DAILY) TABS; Take by mouth  -     Biotin 1 MG CAPS; Take by mouth  -     Cetirizine HCl (ZYRTEC ALLERGY) 10 MG CAPS; Take by mouth          Subjective:     Patient ID: Christy Rangel is a 53 y o  female  Patient is a 64-year-old white female who is status post laparoscopic hysterectomy left oophorectomy bilateral salpingectomy with preservation of the right ovary  She reports that surgical menopause is characterized by significant hot flashes and is night sweats    She has received satisfactory relief from using black cohosh    She is not desired to try any estrogen replacement at this time    Recently she try to discontinue the black cohosh but her symptoms have worsened during that interim    Problems with sleep have been resolved with intermittent use of trazodone    She denies any other significant medical problems            Review of Systems   Constitutional: Negative  HENT: Negative  Eyes: Negative  Respiratory: Negative  Cardiovascular: Negative  Gastrointestinal: Negative  Endocrine: Negative  Genitourinary: Negative  Musculoskeletal: Negative  Skin: Negative  Neurological: Negative  Psychiatric/Behavioral: Negative  Objective:     Physical Exam   Constitutional: She is oriented to person, place, and time  She appears well-developed and well-nourished  Pulmonary/Chest: Effort normal    Abdominal: Soft  Genitourinary:   Genitourinary Comments: Gynecologic exam revealed the following;    Normal pelvic examination    No masses or tenderness were appreciated    Vaginal examination was normal       Musculoskeletal: Normal range of motion  Neurological: She is alert and oriented to person, place, and time  Psychiatric: She has a normal mood and affect   Her behavior is normal  Judgment and thought content normal

## 2018-02-28 RX ORDER — TRAZODONE HYDROCHLORIDE 50 MG/1
50 TABLET ORAL DAILY
Qty: 30 TABLET | Refills: 10 | Status: SHIPPED | OUTPATIENT
Start: 2018-02-28 | End: 2018-12-28

## 2018-05-17 ENCOUNTER — OFFICE VISIT (OUTPATIENT)
Dept: OBGYN CLINIC | Facility: CLINIC | Age: 48
End: 2018-05-17
Payer: COMMERCIAL

## 2018-05-17 VITALS — BODY MASS INDEX: 20.89 KG/M2 | SYSTOLIC BLOOD PRESSURE: 120 MMHG | WEIGHT: 114.2 LBS | DIASTOLIC BLOOD PRESSURE: 70 MMHG

## 2018-05-17 DIAGNOSIS — Z71.9 ENCOUNTER FOR CONSULTATION: Primary | ICD-10-CM

## 2018-05-17 DIAGNOSIS — A63.0 WARTS, GENITAL: ICD-10-CM

## 2018-05-17 DIAGNOSIS — R55 VASOMOTOR INSTABILITY: ICD-10-CM

## 2018-05-17 PROCEDURE — 99213 OFFICE O/P EST LOW 20 MIN: CPT | Performed by: OBSTETRICS & GYNECOLOGY

## 2018-05-17 RX ORDER — POTASSIUM CHLORIDE 750 MG/1
10 TABLET, EXTENDED RELEASE ORAL
COMMUNITY
Start: 2018-03-09 | End: 2019-03-09

## 2018-05-17 RX ORDER — NICOTINE POLACRILEX 2 MG
GUM BUCCAL
COMMUNITY
End: 2018-06-06 | Stop reason: SDUPTHER

## 2018-05-17 RX ORDER — TRAZODONE HYDROCHLORIDE 50 MG/1
50 TABLET ORAL
COMMUNITY
End: 2018-06-06 | Stop reason: SDUPTHER

## 2018-05-17 RX ORDER — ANTACID TABLETS 500 MG/1
1 TABLET, CHEWABLE ORAL
COMMUNITY
End: 2018-06-06 | Stop reason: SDUPTHER

## 2018-05-17 RX ORDER — CETIRIZINE HYDROCHLORIDE 10 MG/1
10 TABLET ORAL
COMMUNITY
End: 2018-06-06 | Stop reason: SDUPTHER

## 2018-05-17 NOTE — PATIENT INSTRUCTIONS
Topic:  Vasomotor symptoms and genital warts    We had an extensive discussion regarding treatment options for vasomotor instability today    At this time patient desires not to use any type of supplement or medical therapy and simply to follow the symptoms    She reported that to genital warts have appeared recently and would like to have them removed    She will make an appointment today for that procedure in the near future    She was told to call the office should any problems issues or concerns arise in the near future    All questions were answered in detail for her at today's visit

## 2018-05-17 NOTE — PROGRESS NOTES
Patient is a 56-year-old white female who presents today for follow-up for fairly significant vasomotor symptoms    Patient reports that she is no longer taking any the natural supplements    She is still experiencing fair amount of hot flashes and particularly insomnia at nighttime    She denies any erratic bleeding however    She denies any headaches or visual changes    She denies any significant pelvic or abdominal pain is well    Our plan at this time will be to continue to observe her symptoms and then determine any new type of treatment plans based upon what they are    All questions regarding this topic were answered in detail for the patient during this visit    She also reported that she has 2 new warts in the genital area    I advised her to make an appointment in the near future and we will remove them for her at that time    Patient was also told to call the office should any problems issues or concerns arise in the future

## 2018-06-06 ENCOUNTER — OFFICE VISIT (OUTPATIENT)
Dept: URGENT CARE | Age: 48
End: 2018-06-06
Payer: COMMERCIAL

## 2018-06-06 VITALS
WEIGHT: 115 LBS | BODY MASS INDEX: 21.71 KG/M2 | HEART RATE: 94 BPM | HEIGHT: 61 IN | OXYGEN SATURATION: 98 % | TEMPERATURE: 98.8 F | RESPIRATION RATE: 14 BRPM | SYSTOLIC BLOOD PRESSURE: 135 MMHG | DIASTOLIC BLOOD PRESSURE: 80 MMHG

## 2018-06-06 DIAGNOSIS — L23.7 POISON IVY DERMATITIS: Primary | ICD-10-CM

## 2018-06-06 PROCEDURE — 99213 OFFICE O/P EST LOW 20 MIN: CPT | Performed by: FAMILY MEDICINE

## 2018-06-06 RX ORDER — PREDNISONE 10 MG/1
TABLET ORAL
Qty: 21 TABLET | Refills: 0 | Status: SHIPPED | OUTPATIENT
Start: 2018-06-06

## 2018-06-06 NOTE — PATIENT INSTRUCTIONS
Take steroid as directed with food and water  Do not take any NSAIDs including Aleve or ibuprofen with this medication  Continue to use anti-itch relieving gels and creams such as calamine lotion or Benadryl gel  Oatmeal baths may help in itch relief  Follow up with your family doctor in 3-5 days  Proceed to the ER are if symptoms worsen

## 2018-06-06 NOTE — PROGRESS NOTES
[facility]      NAME: Peewee Neves is a 53 y o  female  : 1970    MRN: [id number]  DATE: 2018  TIME: 2:41 PM    Assessment and Plan   Poison ivy dermatitis [L23 7]  1  Poison ivy dermatitis  predniSONE 10 mg tablet     Patient Instructions     Take steroid as directed with food and water  Do not take any NSAIDs including Aleve or ibuprofen with this medication  Continue to use anti-itch relieving gels and creams such as calamine lotion or Benadryl gel  Oatmeal baths may help in itch relief  Follow up with your family doctor in 3-5 days  Proceed to the ER are if symptoms worsen  All questions answered  Precautions given  Chief Complaint   No chief complaint on file  History of Present Illness         52-year-old female presenting with complaint of poison ivy along her arms and legs x5 days  Last new crops presented this morning on the upper arms bilaterally  Lesions are itchy and unresponsive to Ivy-dry, topical itch relieving creams, and hydrocortisone  Hydrocortisone is minimally but temporarily relieving of itch  She presents today for treatment but denies any throat itching, tongue swelling, shortness of breath, difficulty breathing, cough, or wheezing  She believes she was exposed in her backyard and did rinse skin using soap and water following the exposure  No subsequent exposures  Review of Systems   Review of Systems   Constitutional: Negative for chills and fever  Respiratory: Negative for cough, shortness of breath and wheezing  Cardiovascular: Negative for chest pain and palpitations  Gastrointestinal: Positive for diarrhea (  Single episode early in illness, now resolved)  Negative for abdominal pain, nausea and vomiting  Skin: Positive for rash  Neurological: Negative for light-headedness and headaches       Current Medications       Current Outpatient Prescriptions:     Biotin 1 MG CAPS, Take by mouth, Disp: , Rfl:     CALCIUM PO, Take 1 tablet by mouth daily, Disp: , Rfl:     cetirizine (ZyrTEC) 10 mg tablet, Take 10 mg by mouth daily, Disp: , Rfl:     Multiple Vitamin (MULTI-VITAMIN DAILY) TABS, Take by mouth, Disp: , Rfl:     potassium chloride (K-DUR,KLOR-CON) 10 mEq tablet, Take 10 mEq by mouth, Disp: , Rfl:     traZODone (DESYREL) 50 mg tablet, Take 1 tablet (50 mg total) by mouth daily for 303 days, Disp: 30 tablet, Rfl: 10    predniSONE 10 mg tablet, Take 6 tablets day 1, 5 tablets day 2, 4 tablets day 3, 3 tablets day 4, 2 tablets day 5, 1 tablet day 6 , Disp: 21 tablet, Rfl: 0    Current Allergies     Allergies as of 06/06/2018 - Reviewed 06/06/2018   Allergen Reaction Noted    Sulfa antibiotics GI Intolerance 10/25/2011    Sulfur Vomiting 11/10/2014            The following portions of the patient's history were reviewed and updated as appropriate: allergies, current medications, past family history, past medical history, past social history, past surgical history and problem list      History reviewed  No pertinent past medical history      Past Surgical History:   Procedure Laterality Date    EXCISION CYST SKENES GLAND      fulguration of Chadwicks's gland    GYNECOLOGIC CRYOSURGERY      cervix; for abnormal pap    HYSTERECTOMY      LAPAROSCOPIC TOTAL HYSTERECTOMY      MOUTH SURGERY      IN LAPAROSCOPY W TOT HYSTERECTUTERUS <=250 GRAM  W TUBE/OVARY N/A 10/31/2017    Procedure: TOTAL LAPAROSCOPIC HYSTERECTOMY; BILATERAL SALPINECTOMY; LEFT OOPHERECTOMY; CYSTOSCOPY;  Surgeon: Ebonie Cherry MD;  Location: [facility];  Service: Gynecology Oncology    SALPINGECTOMY Bilateral     VAGINAL MASS EXCISION      WISDOM TOOTH EXTRACTION         Family History   Problem Relation Age of Onset    Brain cancer Father     Hodgkin's lymphoma Paternal Grandfather      hodgkin's disease    Ovarian cancer Maternal Aunt     Cancer Maternal Uncle      lymphatic or hmatopoietic cancer         Medications have been verified  Objective   /80   Pulse 94   Temp 98 8 °F (37 1 °C)   Resp 14   Ht 5' 1" (1 549 m)   Wt 52 2 kg (115 lb)   SpO2 98%   BMI 21 73 kg/m²        Physical Exam     Physical Exam   Constitutional: She is oriented to person, place, and time  She appears well-developed and well-nourished  No distress  HENT:   Head: Normocephalic and atraumatic  Eyes: Conjunctivae are normal    Cardiovascular: Normal rate, regular rhythm and normal heart sounds  Pulmonary/Chest: Effort normal and breath sounds normal  No respiratory distress  She has no decreased breath sounds  She has no wheezes  She has no rhonchi  She has no rales  Neurological: She is alert and oriented to person, place, and time  No cranial nerve deficit  She exhibits normal muscle tone  Coordination normal    Skin: Skin is warm and dry  Rash ( slap cystic appearance of vesicular rash on bilateral upper extremities and lower extremities  Multiple areas of excoriation as well as ruptured vesicles  No signs of secondary infection ) noted  She is not diaphoretic  Psychiatric: She has a normal mood and affect  Her behavior is normal    Nursing note and vitals reviewed

## 2018-06-15 ENCOUNTER — PROCEDURE VISIT (OUTPATIENT)
Dept: OBGYN CLINIC | Facility: CLINIC | Age: 48
End: 2018-06-15
Payer: COMMERCIAL

## 2018-06-15 VITALS — DIASTOLIC BLOOD PRESSURE: 74 MMHG | WEIGHT: 115.4 LBS | SYSTOLIC BLOOD PRESSURE: 110 MMHG | BODY MASS INDEX: 21.8 KG/M2

## 2018-06-15 DIAGNOSIS — A63.0 VAGINAL VENEREAL WARTS: Primary | ICD-10-CM

## 2018-06-15 PROCEDURE — 88305 TISSUE EXAM BY PATHOLOGIST: CPT | Performed by: PATHOLOGY

## 2018-06-15 PROCEDURE — 56605 BIOPSY OF VULVA/PERINEUM: CPT | Performed by: OBSTETRICS & GYNECOLOGY

## 2018-06-15 NOTE — PATIENT INSTRUCTIONS
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

## 2018-06-15 NOTE — PROGRESS NOTES
Skin excision  Date/Time: 6/15/2018 1:43 PM  Performed by: Blanca Lei  Authorized by: Blanca Lei     Procedure Details - Skin Excision:     Number of Lesions:  2  Lesion 1:     Body area: Anogenital    Anogenital location:  Vulva    Initial size (mm):  10       Final defect size (mm):  15    Malignancy: malignancy unknown      Destruction method: scissors used for extraction      Repair type:  Linear closure (3-0 interrupted Vicryl)    Closure complexity: simple       Diagnosis, probable venereal wart    Excision performed without difficulty and complete hemostasis achieved with Monsel's solution      Lesion 2:     Body area:   Anogenital    Anogenital location:  Vulva        Initial size (mm):  10    Final defect size (mm):  20    Malignancy: malignancy unknown      Destruction method: scissors used for extraction      Repair type:  Linear closure    Closure complexity: simple       Topic:  Probable venereal wart    Excision performed without difficulty and small defect closed with interrupted 3 0 Vicryl suture    Excellent hemostasis confirmed for both sites

## 2018-06-28 ENCOUNTER — TELEPHONE (OUTPATIENT)
Dept: OBGYN CLINIC | Facility: CLINIC | Age: 48
End: 2018-06-28

## 2018-06-28 NOTE — TELEPHONE ENCOUNTER
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

## 2018-06-28 NOTE — TELEPHONE ENCOUNTER
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

## 2019-11-20 NOTE — PROGRESS NOTES
Assessment    1  Postoperative visit (V58 49) (Z48 89)   2  Insomnia (780 52) ()  56year-old status post total laparoscopic hysterectomy, bilateral salpingectomy, left oophorectomy for benign disease  She is recovering well from surgery  She has insomnia  Her performance status is 0  Plan  Insomnia    · TraZODone HCl - 50 MG Oral Tablet; TAKE 1 TABLET AT BEDTIME AS NEEDEDFOR SLEEP   Rx By: Florian Penaloza; Dispense: 30 Days ; #:30 Tablet; Refill: 3;Insomnia; YOUSIF = N; Verified Transmission to [facility]; Last Updated By: SystemGigit; 11/15/2017 10:59:33 AM  Postoperative visit    · Follow-up Visit in 4 Weeks Evaluation and Treatment  Follow-up  Status: Complete Done: [id number]   Ordered;Postoperative visit; Ordered By: Florian Penaloza Performed:  Due: [id number]; Last Updated By: Louise Villasenor; 11/15/2017 10:59:55 AM  1  Return in 4 weeks for postoperative care  I reviewed continued activity restrictions  2   I prescribed trazodone 50 mg p o  q h s  p r n  for sleep  Chief Complaint  Postoperative evaluation      Post-Op  Problem [facility]:   Pelvic pain, left ovarian cyst, abnormal uterine bleeding  Previous Therapy:   1  Total laparoscopic hysterectomy, bilateral salpingectomy, left oophorectomy on 10/31/2017A  Serous cystadenoma  No other evidence of malignancy or dysplasia  Free Text HPI: 16-year-old returns for postoperative evaluation  She continues to have hot flashes  Her preoperative FSH was elevated  She is only taking Advil occasionally for pain  She is having difficulty sleeping at night and melatonin has not been helping  She has no vaginal bleeding  She is active  She has normal bowel and bladder function  She is afebrile  Active Problems    1  Abnormal uterine bleeding (626 9) (N93 9)   2  Cervical polyp (622 7) (N84 1)   3  Encounter for biopsy (V72 83) (Z01 818)   4  Encounter for cervical Pap smear with pelvic exam (V76 2,V72 31) (Z01 419)   5   Encounter for consultation (V65 9) (Z71 9)   6  Encounter for routine gynecological examination (V72 31) (Z01 419)   7  Encounter for tubal ligation counseling (V25 09) (Z30 8)   8  Female pelvic pain (625 9) (R10 2)   9  Left ovarian cyst (620 2) (N83 202)   10  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)   11  Pap smear, as part of routine gynecological examination (V76 2) (Z01 419)   12  Visit for screening mammogram (V76 12) (Z12 31)    Social History     · Current every day smoker (305 1) (F17 200)   · 5-6 cig's a day   · Denied: History of Home environment domestic violence   · Social alcohol use (Z78 9)    Current Meds   1  FLUoxetine HCl - 10 MG Oral Tablet; Therapy: (Recorded:11Nov2014) to Recorded   2  Multi Vitamin Daily TABS; Therapy: (Recorded:11Nov2014) to Recorded   3  ZyrTEC Allergy CAPS; Therapy: (Recorded:11Nov2014) to Recorded    Allergies  1  Sulfur    Vitals   Recorded: [id number] 10:32AM   Temperature 98 3 F, Oral   Heart Rate 80, L Radial   Pulse Quality Normal, L Radial   Respiration Quality Normal   Respiration 16   Systolic 058, LUE, Sitting   Diastolic 70, LUE, Sitting   Height 5 ft 1 in   Weight 116 lb 2 oz   BMI Calculated 21 94   BSA Calculated 1 5       Physical Exam   Constitutional  General appearance: No acute distress, well appearing and well nourished  Pulmonary  Respiratory effort: No increased work of breathing or signs of respiratory distress  Abdomen  Abdomen: Normal, soft, non-tender, and no organomegaly noted  Skin  Skin and subcutaneous tissue: Normal skin turgor and no rashes  -- Incisions are clean dry and intact    Psychiatric  Orientation to person, place, and time: Normal    Mood and affect: Normal    GOG performance status is: 0      Signatures   Electronically signed by : Tanner Kline MD; Nov 15 2017 11:02AM EST                       (Author) Island Pedicle Flap Text: The defect edges were debeveled with a #15 scalpel blade.  Given the location of the defect, shape of the defect and the proximity to free margins an island pedicle advancement flap was deemed most appropriate.  Using a sterile surgical marker, an appropriate advancement flap was drawn incorporating the defect, outlining the appropriate donor tissue and placing the expected incisions within the relaxed skin tension lines where possible.    The area thus outlined was incised deep to adipose tissue with a #15 scalpel blade.  The skin margins were undermined to an appropriate distance in all directions around the primary defect and laterally outward around the island pedicle utilizing iris scissors.  There was minimal undermining beneath the pedicle flap.

## 2024-04-25 ENCOUNTER — TELEPHONE (OUTPATIENT)
Age: 54
End: 2024-04-25

## 2024-04-25 ENCOUNTER — OFFICE VISIT (OUTPATIENT)
Dept: OBGYN CLINIC | Facility: CLINIC | Age: 54
End: 2024-04-25
Payer: COMMERCIAL

## 2024-04-25 VITALS
HEIGHT: 61 IN | DIASTOLIC BLOOD PRESSURE: 82 MMHG | WEIGHT: 125.6 LBS | SYSTOLIC BLOOD PRESSURE: 122 MMHG | BODY MASS INDEX: 23.71 KG/M2

## 2024-04-25 DIAGNOSIS — N95.2 ATROPHIC VAGINITIS: ICD-10-CM

## 2024-04-25 DIAGNOSIS — Z12.31 ENCOUNTER FOR SCREENING MAMMOGRAM FOR MALIGNANT NEOPLASM OF BREAST: Primary | ICD-10-CM

## 2024-04-25 DIAGNOSIS — N95.1 VASOMOTOR SYMPTOMS DUE TO MENOPAUSE: ICD-10-CM

## 2024-04-25 DIAGNOSIS — Z01.419 ENCOUNTER FOR GYNECOLOGICAL EXAMINATION WITHOUT ABNORMAL FINDING: ICD-10-CM

## 2024-04-25 DIAGNOSIS — Z12.11 COLON CANCER SCREENING: ICD-10-CM

## 2024-04-25 PROCEDURE — G0476 HPV COMBO ASSAY CA SCREEN: HCPCS | Performed by: OBSTETRICS & GYNECOLOGY

## 2024-04-25 PROCEDURE — S0610 ANNUAL GYNECOLOGICAL EXAMINA: HCPCS | Performed by: OBSTETRICS & GYNECOLOGY

## 2024-04-25 PROCEDURE — G0145 SCR C/V CYTO,THINLAYER,RESCR: HCPCS | Performed by: OBSTETRICS & GYNECOLOGY

## 2024-04-25 NOTE — PROGRESS NOTES
Assessment/Plan:    Vasomotor symptoms due to menopause  - Patient is having very bothersome hot flashes and night sweats and is interested in pursuing treatment at this time. She has history of hysterectomy, so she is a candidate for estrogen therapy alone. We will start with Premarin 0.45 mg 1 tablet to be taken once daily.. We will follow up in 3-4 months to discuss how she is doing on this dose. Patient agrees to above plan and all questions were answered.     Encounter for gynecological examination without abnormal findings:  - Patient with history of vaginal venereal warts, so we will obtain a pap of the vaginal vault today.   Normal gynecological physical examination.  Self-breast examination stressed.  Mammogram ordered.  Colonoscopy referral sent.   Discussed regular exercise, healthy diet, importance of vitamin D and calcium supplements.  Discussed importance of sun block use during periods of prolonged sun exposure.  Patient will be seen in 1 year for routine gynecologic and medical examination.  Patient will call office for any problems, concerns, or issues which may arise during the interim.        Diagnoses and all orders for this visit:    Encounter for screening mammogram for malignant neoplasm of breast  -     Mammo screening bilateral w 3d & cad; Future    Encounter for gynecological examination without abnormal finding  -     Liquid-based pap, screening    Colon cancer screening  -     Ambulatory Referral to Colorectal Surgery; Future    Vasomotor symptoms due to menopause  -     conjugated estrogens (Premarin) 0.45 mg tablet; Take 1 tablet daily    Atrophic vaginitis    Other orders  -     Ascorbic Acid (VITAMIN C PO); Take by mouth  -     VITAMIN D PO; Take by mouth          Subjective:          HPI    Patient ID: Miranda Cortes is a 54 y.o. female who presents today for her annual gynecologic and medical examination. She states that she has not been to a doctor since 2018.     Menstrual  status: She had a hysterectomy in 2018 with a left oophorectomy. She states that she had an ovarian fibroid which prompted this surgery. No vaginal bleeding, itchiness, or dyspareunia.     Vasomotor symptoms: She states she is having severe hot flashes, night sweats. She is interested in treatment at this time as it is very bothersome.     Patient reports normal appetite    Patient reports normal bowel and bladder habits    Patient denies any significant pelvic or abdominal pain    Patient denies any headaches, chest pain, shortness of breath fever shakes or chills    Patient denies any COVID 19 symptoms including cough or loss of taste or smell    COVID vaccine status: UTD    Medical problems: none.     Colonoscopy status: Has not had one yet. Would like one ordered at today's visit.     Mammogram status: She states that her last mammogram was around 2010 and that she had a biopsy and they stated it was just calcifications. She does do self breast exams, and has not noticed any new masses, skin changes, or nipple discharge. She has two paternal cousins with breast cancer. Mammogram ordered.     Her aunt passed away from ovarian cancer.     The following portions of the patient's history were reviewed and updated as appropriate: allergies, current medications, past family history, past medical history, past social history, past surgical history and problem list.    Review of Systems   Constitutional: Negative.  Negative for appetite change, diaphoresis, fatigue, fever and unexpected weight change.   HENT: Negative.     Eyes: Negative.    Respiratory: Negative.     Cardiovascular: Negative.    Gastrointestinal: Negative.  Negative for abdominal pain, blood in stool, constipation, diarrhea, nausea and vomiting.   Endocrine: Positive for heat intolerance (hot flashes, night sweats). Negative for cold intolerance.   Genitourinary: Negative.  Negative for dysuria, frequency, hematuria, urgency, vaginal bleeding, vaginal  "discharge and vaginal pain.   Musculoskeletal: Negative.    Skin: Negative.    Allergic/Immunologic: Negative.    Neurological: Negative.    Hematological: Negative.  Negative for adenopathy.   Psychiatric/Behavioral: Negative.           Objective:      /82   Ht 5' 1\" (1.549 m)   Wt 57 kg (125 lb 9.6 oz)   BMI 23.73 kg/m²          Physical Exam  Exam conducted with a chaperone present.   Constitutional:       General: She is not in acute distress.     Appearance: Normal appearance. She is well-developed. She is not diaphoretic.   HENT:      Head: Normocephalic and atraumatic.   Eyes:      Pupils: Pupils are equal, round, and reactive to light.   Cardiovascular:      Rate and Rhythm: Normal rate and regular rhythm.      Heart sounds: Normal heart sounds. No murmur heard.     No friction rub. No gallop.   Pulmonary:      Effort: Pulmonary effort is normal.      Breath sounds: Normal breath sounds.   Chest:   Breasts:     Breasts are symmetrical.      Right: No inverted nipple, mass, nipple discharge, skin change or tenderness.      Left: No inverted nipple, mass, nipple discharge, skin change or tenderness.   Abdominal:      General: Bowel sounds are normal.      Palpations: Abdomen is soft.   Genitourinary:     General: Normal vulva.      Exam position: Supine.      Labia:         Right: No rash or lesion.         Left: No rash or lesion.       Vagina: Normal. No vaginal discharge, erythema, tenderness or bleeding.      Uterus: Absent.       Adnexa:         Right: No mass, tenderness or fullness.          Left: No mass, tenderness or fullness.        Rectum: Normal. Guaiac result negative.      Comments: History of MARY with right sided oophorectomy.   Musculoskeletal:         General: Normal range of motion.      Cervical back: Normal range of motion and neck supple.   Lymphadenopathy:      Cervical: No cervical adenopathy.      Upper Body:      Right upper body: No supraclavicular adenopathy.      Left " upper body: No supraclavicular adenopathy.   Skin:     General: Skin is warm and dry.      Findings: No rash.   Neurological:      General: No focal deficit present.      Mental Status: She is alert and oriented to person, place, and time.   Psychiatric:         Mood and Affect: Mood normal.         Speech: Speech normal.         Behavior: Behavior normal.         Thought Content: Thought content normal.         Judgment: Judgment normal.

## 2024-04-25 NOTE — TELEPHONE ENCOUNTER
Pt requesting an alternative medication to recently prescribed Premarin (conjugated estrogens (Premarin) 0.45 mg tablet). States insurance will not cover and it is too expensive. She is also asking that any new medication be sent over to [address]. Please advise.

## 2024-04-25 NOTE — PATIENT INSTRUCTIONS
Prescription for estrogen replacement was sent to your pharmacy. We will follow up in 3-4 months for this.     Normal gynecological physical examination.  Self-breast examination stressed.  Mammogram ordered.  Colonoscopy referral made.  Discussed regular exercise, healthy diet, importance of vitamin D and calcium supplements.  Discussed importance of sun block use during periods of prolonged sun exposure.  Patient will be seen in 1 year for routine gynecologic and medical examination.  Patient will call office for any problems, concerns, or issues which may arise during the interim.

## 2024-04-29 LAB
HPV HR 12 DNA CVX QL NAA+PROBE: NEGATIVE
HPV16 DNA CVX QL NAA+PROBE: NEGATIVE
HPV18 DNA CVX QL NAA+PROBE: NEGATIVE

## 2024-05-03 LAB
LAB AP GYN PRIMARY INTERPRETATION: NORMAL
Lab: NORMAL

## 2024-05-13 DIAGNOSIS — N95.1 VASOMOTOR SYMPTOMS DUE TO MENOPAUSE: Primary | ICD-10-CM

## 2024-05-13 RX ORDER — ESTRADIOL 0.5 MG/1
0.5 TABLET ORAL DAILY
Qty: 30 TABLET | Refills: 5 | Status: SHIPPED | OUTPATIENT
Start: 2024-05-13

## 2024-11-15 DIAGNOSIS — N95.1 VASOMOTOR SYMPTOMS DUE TO MENOPAUSE: ICD-10-CM

## 2024-11-15 RX ORDER — ESTRADIOL 0.5 MG/1
0.5 TABLET ORAL DAILY
Qty: 30 TABLET | Refills: 5 | Status: SHIPPED | OUTPATIENT
Start: 2024-11-15

## 2024-11-15 NOTE — TELEPHONE ENCOUNTER
Patient is requesting refill for estradiol (Estrace) 0.5 MG tablet sent to [facility] at [address] [place] [address]

## 2024-12-05 ENCOUNTER — OFFICE VISIT (OUTPATIENT)
Dept: OBGYN CLINIC | Facility: CLINIC | Age: 54
End: 2024-12-05
Payer: COMMERCIAL

## 2024-12-05 VITALS — BODY MASS INDEX: 23.43 KG/M2 | SYSTOLIC BLOOD PRESSURE: 130 MMHG | WEIGHT: 124 LBS | DIASTOLIC BLOOD PRESSURE: 80 MMHG

## 2024-12-05 DIAGNOSIS — N95.2 ATROPHIC VAGINITIS: ICD-10-CM

## 2024-12-05 DIAGNOSIS — L72.3 SEBACEOUS CYST: Primary | ICD-10-CM

## 2024-12-05 DIAGNOSIS — N95.1 VASOMOTOR SYMPTOMS DUE TO MENOPAUSE: ICD-10-CM

## 2024-12-05 PROCEDURE — 99214 OFFICE O/P EST MOD 30 MIN: CPT | Performed by: OBSTETRICS & GYNECOLOGY

## 2024-12-05 NOTE — PATIENT INSTRUCTIONS
The genital lesion appears to be a sebaceous cyst.   Gave pt s/sx to be aware of including growth, protrusion, or discomfort.   Estrace was rx today.   Culture for mentioned discharge will not be necessary at this time, but gave patient precautions to watch out for including vaginal itching, change in discharge color, foul odor, vaginal pain, abdominal cramps.  Call the office with any questions, comments, or concerns in the interim.   RTO for annual exam.

## 2024-12-05 NOTE — PROGRESS NOTES
Assessment/Plan:      Diagnoses and all orders for this visit:    Sebaceous cyst    Vasomotor symptoms due to menopause  -     estradiol (Estrace) 0.5 MG tablet; Take 1 tablet (0.5 mg total) by mouth daily    Atrophic vaginitis          Subjective:     Patient ID: Miranda Cortes is a 54 y.o. female.    Miranda Cortes is a 54 y.o. female presenting to the office today for medication refill as well as a check on into work.  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  1 was removed in 2018.  She states this is a new occurrence that is on the left labia minora.  She states is not comfortable for her, but given her history she was told that she should have any warts removed today could become cancerous.  She has no history of HPV on cervical screenings.    Patient is also here today for a refill of her Estrace.  Her insurance not cover her previous medication and the new prescription would not be filled by the medication management team.  She states that the estrogen has been helping significantly with her hot flashes, night sweats.  She states that her quality of life has improved significantly and she would like to continue the medication.  She states that since starting the medication for the last few weeks she has been having a clear discharge.  She states that she showers frequently and still notices a discharge and a light odor.   The discharge is white in color, and did not have any clumpy or watery texture.  She states that she is sexually active, but has had the same partner for multiple years, and is no concern for any STIs.  Culture will not be necessary at this time, but gave patient precautions to watch out for including vaginal itching, change in discharge color, foul odor, vaginal pain, abdominal cramps.    All questions were answered at today's visit. The genital lesion appears to be a sebaceous cyst. Gave pt s/sx to be aware of including growth, protrusion, or discomfort. Pt is amenable to  the plan. Estrace was rx today. Call the office with any questions, comments, or concerns in the interim. RTO for annual exam.      Total time of today's visit was 25 minutes of which greater than 50% was spent face-to-face counseling the patient as well as coordination of care, review of chart and lab values, physical examination as well as computer entry into the [facility] medical record system.          Review of Systems   Constitutional: Negative.  Negative for appetite change, diaphoresis, fatigue, fever and unexpected weight change.   HENT: Negative.     Eyes: Negative.    Respiratory: Negative.     Cardiovascular: Negative.    Gastrointestinal: Negative.  Negative for abdominal pain, blood in stool, constipation, diarrhea, nausea and vomiting.   Endocrine: Negative.  Negative for cold intolerance and heat intolerance.   Genitourinary:  Positive for genital sores (Hx HPV warts) and vaginal discharge (Pt was recently started on estrogen orally). Negative for decreased urine volume, dyspareunia, dysuria, frequency, hematuria, pelvic pain, urgency, vaginal bleeding and vaginal pain.   Musculoskeletal: Negative.    Skin: Negative.    Allergic/Immunologic: Negative.    Neurological: Negative.    Hematological: Negative.  Negative for adenopathy.   Psychiatric/Behavioral: Negative.           Objective:     Physical Exam  Constitutional:       Appearance: She is well-developed.   HENT:      Head: Normocephalic.   Eyes:      Pupils: Pupils are equal, round, and reactive to light.   Cardiovascular:      Rate and Rhythm: Normal rate.   Pulmonary:      Effort: Pulmonary effort is normal.   Abdominal:      Palpations: Abdomen is soft.   Genitourinary:     General: Normal vulva.      Vagina: No vaginal discharge.          Comments: Sebaceous cyst to L lower labia majora  Musculoskeletal:         General: Normal range of motion.      Cervical back: Normal range of motion.   Skin:     General: Skin is warm and dry.    Neurological:      General: No focal deficit present.      Mental Status: She is alert and oriented to person, place, and time.   Psychiatric:         Mood and Affect: Mood normal.         Behavior: Behavior normal.         Thought Content: Thought content normal.         Judgment: Judgment normal.

## 2024-12-06 RX ORDER — ESTRADIOL 0.5 MG/1
0.5 TABLET ORAL DAILY
Qty: 30 TABLET | Refills: 5 | Status: SHIPPED | OUTPATIENT
Start: 2024-12-06

## 2025-06-21 DIAGNOSIS — N95.1 VASOMOTOR SYMPTOMS DUE TO MENOPAUSE: ICD-10-CM

## 2025-06-23 RX ORDER — ESTRADIOL 0.5 MG/1
0.5 TABLET ORAL DAILY
Qty: 30 TABLET | Refills: 5 | Status: SHIPPED | OUTPATIENT
Start: 2025-06-23

## (undated) DEVICE — GLOVE SRG BIOGEL 7.5

## (undated) DEVICE — INTENDED FOR TISSUE SEPARATION, AND OTHER PROCEDURES THAT REQUIRE A SHARP SURGICAL BLADE TO PUNCTURE OR CUT.: Brand: BARD-PARKER SAFETY BLADES SIZE 11, STERILE

## (undated) DEVICE — ALL PURPOSE SPONGES,NONWOVEN, 4 PLY: Brand: CURITY

## (undated) DEVICE — 1820 FOAM BLOCK NEEDLE COUNTER: Brand: DEVON

## (undated) DEVICE — GAUZE SPONGES,16 PLY: Brand: CURITY

## (undated) DEVICE — INTENDED FOR TISSUE SEPARATION, AND OTHER PROCEDURES THAT REQUIRE A SHARP SURGICAL BLADE TO PUNCTURE OR CUT.: Brand: BARD-PARKER SAFETY BLADES SIZE 15, STERILE

## (undated) DEVICE — SYRINGE 50ML LL

## (undated) DEVICE — SUT VICRYL 0 CT-1 27 IN J260H

## (undated) DEVICE — MAYO STAND COVER: Brand: CONVERTORS

## (undated) DEVICE — ANTI-FOG SOLUTION WITH FOAM PAD: Brand: DEVON

## (undated) DEVICE — 3000CC GUARDIAN II: Brand: GUARDIAN

## (undated) DEVICE — TRAY FOLEY 16FR URIMETER SURESTEP

## (undated) DEVICE — ENDOPATH XCEL UNIVERSAL TROCAR STABLILITY SLEEVES: Brand: ENDOPATH XCEL

## (undated) DEVICE — SUT STRATAFIX SPIRAL 2-0 CT-1 20 CM SXPD1B400

## (undated) DEVICE — STRL COTTON TIP APPLCTR 6IN PK: Brand: CARDINAL HEALTH

## (undated) DEVICE — OCCLUDER COLPO-PNEUMO

## (undated) DEVICE — IRRIG ENDO FLO TUBING

## (undated) DEVICE — ABDOMINAL PAD: Brand: DERMACEA

## (undated) DEVICE — CHLORAPREP HI-LITE 26ML ORANGE

## (undated) DEVICE — SCD SEQUENTIAL COMPRESSION COMFORT SLEEVE MEDIUM KNEE LENGTH: Brand: KENDALL SCD

## (undated) DEVICE — UTERINE MANIPULATOR RUMI 6.7 X 10 CM

## (undated) DEVICE — TROCAR PORT ACCESS 5 X120MML W/BLDLS OPTICAL TIP AIRSEAL

## (undated) DEVICE — CHLORHEXIDINE 4PCT 4 OZ

## (undated) DEVICE — ADHESIVE SKN CLSR HISTOACRYL FLEX 0.5ML LF

## (undated) DEVICE — SUT MONOCRYL 4-0 PS-2 27 IN Y426H

## (undated) DEVICE — NEEDLE 25GA X 1 IN SAFETY GLIDE

## (undated) DEVICE — AEM CORD

## (undated) DEVICE — ENDOPATH XCEL BLADELESS TROCARS WITH STABILITY SLEEVES: Brand: ENDOPATH XCEL

## (undated) DEVICE — MEDI-VAC YANK SUCT HNDL W/TPRD BULBOUS TIP: Brand: CARDINAL HEALTH

## (undated) DEVICE — ENSEAL 20 CM SHAFT, LARGE JAW: Brand: ENSEAL X1

## (undated) DEVICE — SYRINGE 10ML LL

## (undated) DEVICE — INSUFFLATION TUBING PRIMFLO

## (undated) DEVICE — GLOVE INDICATOR PI UNDERGLOVE SZ 8 BLUE

## (undated) DEVICE — STERILE LAP LITHOTOMY PACK: Brand: CARDINAL HEALTH